# Patient Record
Sex: MALE | Race: WHITE | NOT HISPANIC OR LATINO | Employment: OTHER | URBAN - NONMETROPOLITAN AREA
[De-identification: names, ages, dates, MRNs, and addresses within clinical notes are randomized per-mention and may not be internally consistent; named-entity substitution may affect disease eponyms.]

---

## 2018-12-04 ENCOUNTER — OFFICE VISIT (OUTPATIENT)
Dept: GASTROENTEROLOGY | Facility: CLINIC | Age: 58
End: 2018-12-04

## 2018-12-04 VITALS
BODY MASS INDEX: 30.91 KG/M2 | DIASTOLIC BLOOD PRESSURE: 68 MMHG | WEIGHT: 228.2 LBS | HEIGHT: 72 IN | TEMPERATURE: 98.7 F | HEART RATE: 76 BPM | OXYGEN SATURATION: 98 % | SYSTOLIC BLOOD PRESSURE: 118 MMHG

## 2018-12-04 DIAGNOSIS — Z86.010 HX OF COLONIC POLYPS: Primary | ICD-10-CM

## 2018-12-04 PROCEDURE — S0285 CNSLT BEFORE SCREEN COLONOSC: HCPCS | Performed by: NURSE PRACTITIONER

## 2018-12-04 RX ORDER — LISINOPRIL 20 MG/1
20 TABLET ORAL DAILY
COMMUNITY

## 2018-12-04 RX ORDER — ATORVASTATIN CALCIUM 20 MG/1
20 TABLET, FILM COATED ORAL DAILY
COMMUNITY

## 2018-12-04 RX ORDER — GLIMEPIRIDE 2 MG/1
2 TABLET ORAL 2 TIMES DAILY
COMMUNITY

## 2018-12-04 NOTE — PROGRESS NOTES
Chief Complaint   Patient presents with   • Colon Cancer Screening     Subjective   HPI  Tenzin Sousa is a 58 y.o. male who presents as a referral for preventative maintenance. He has no complaints of nausea or vomiting. No change in bowels. No wt loss. No BRBPR. No melena. There is no family hx for colon cancer. No abdominal pain. There was no Cologuard screening this year. The patients last colonoscopy was 1/3/14 with finding of polyp.     Past Medical History:   Diagnosis Date   • Colon polyp    • DM (diabetes mellitus) (CMS/HCC)    • HTN (hypertension)    • Hyperlipemia      Past Surgical History:   Procedure Laterality Date   • COLONOSCOPY  01/03/2015       Current Outpatient Medications:   •  atorvastatin (LIPITOR) 20 MG tablet, Take 20 mg by mouth Daily., Disp: , Rfl:   •  glimepiride (AMARYL) 2 MG tablet, Take 2 mg by mouth Every Morning Before Breakfast., Disp: , Rfl:   •  lisinopril (PRINIVIL,ZESTRIL) 20 MG tablet, Take 20 mg by mouth Daily., Disp: , Rfl:   •  metFORMIN (GLUCOPHAGE) 500 MG tablet, Take 500 mg by mouth 2 (Two) Times a Day With Meals., Disp: , Rfl:   •  SITagliptin (JANUVIA) 100 MG tablet, Take 100 mg by mouth Daily., Disp: , Rfl:   •  Sod Picosulfate-Mag Ox-Cit Acd (CLENPIQ) 10-3.5-12 MG-GM -GM/160ML solution, Take 1 container by mouth Take As Directed., Disp: 1 bottle, Rfl: 0  No Known Allergies  Social History     Socioeconomic History   • Marital status:      Spouse name: Not on file   • Number of children: Not on file   • Years of education: Not on file   • Highest education level: Not on file   Social Needs   • Financial resource strain: Not on file   • Food insecurity - worry: Not on file   • Food insecurity - inability: Not on file   • Transportation needs - medical: Not on file   • Transportation needs - non-medical: Not on file   Occupational History   • Not on file   Tobacco Use   • Smoking status: Never Smoker   • Smokeless tobacco: Never Used   Substance and Sexual  "Activity   • Alcohol use: No     Frequency: Never   • Drug use: Not on file   • Sexual activity: Not on file   Other Topics Concern   • Not on file   Social History Narrative   • Not on file     Family History   Problem Relation Age of Onset   • Colon cancer Neg Hx    • Colon polyps Neg Hx        REVIEW OF SYSTEMS  General: well appearing, no fever chills or sweats, no unexplained wt loss  HEENT: no acute visual or hearing disturbances  Cardiovascular: No chest pain or palpitations  Pulmonary: No shortness of breath, coughing, wheezing or hemoptysis  : No burning, urgency, hematuria, or dysuria  Musculoskeletal: No joint pain or stiffness  Peripheral: no edema  Skin: No lesions or rashes  Neuro: No dizziness, headaches, stroke, syncope  Endocrine: No hot or cold intolerances  Hematological: No blood dyscrasias    Objective   Vitals:    12/04/18 0910   BP: 118/68   Pulse: 76   Temp: 98.7 °F (37.1 °C)   SpO2: 98%   Weight: 104 kg (228 lb 3.2 oz)   Height: 182.9 cm (72\")     Body mass index is 30.95 kg/m².    PHYSICAL EXAM  General: age appropriate well nourished well appearing, no acute distress  Head: normocephalic and atraumatic  Global assessment-supple  Neck-No JVD noted, no lymphadenopathy  Pulmonary-clear to auscultation bilaterally, normal respiratory effort  Cardiovascular-normal rate and rhythm, normal heart sounds, S1 and S2 noted  Abdomen-soft, non tender, non distended, normal bowel sounds all 4 quadrants, no hepatosplenomegaly noted  Extremities-No clubbing cyanosis or edema  Neuro-Non focal, converses appropriately, awake, alert, oriented    Imaging Results (most recent)     None        Assessment/Plan   Tenzin was seen today for colon cancer screening.    Diagnoses and all orders for this visit:    Hx of colonic polyps  -     Case Request; Standing  -     Case Request    Other orders  -     Follow Anesthesia Guidelines / Standing Orders; Future  -     Implement Anesthesia Orders Day of Procedure; " Standing  -     Obtain Informed Consent; Standing  -     Verify bowel prep was successful; Standing  -     Sod Picosulfate-Mag Ox-Cit Acd (CLENPIQ) 10-3.5-12 MG-GM -GM/160ML solution; Take 1 container by mouth Take As Directed.      COLONOSCOPY WITH ANESTHESIA (N/A)       Body mass index is 30.95 kg/m². Patient's Body mass index is 30.95 kg/m². BMI is above normal parameters. Recommendations include: no follow-up required.      All risks, benefits, alternatives, and indications of colonoscopy procedure have been discussed with the patient. Risks to include perforation of the colon requiring possible surgery or colostomy, risk of bleeding from biopsies or removal of colon tissue, possibility of missing a colon polyp or cancer, or adverse drug reaction.  Benefits to include the diagnosis and management of disease of the colon and rectum. Alternatives to include barium enema, radiographic evaluation, lab testing or no intervention. Pt verbalizes understanding and agrees.

## 2019-01-02 ENCOUNTER — HOSPITAL ENCOUNTER (OUTPATIENT)
Facility: HOSPITAL | Age: 59
Setting detail: HOSPITAL OUTPATIENT SURGERY
Discharge: HOME | End: 2019-01-02
Attending: INTERNAL MEDICINE | Admitting: INTERNAL MEDICINE

## 2019-01-02 ENCOUNTER — ANESTHESIA (OUTPATIENT)
Dept: GASTROENTEROLOGY | Facility: HOSPITAL | Age: 59
End: 2019-01-02

## 2019-01-02 ENCOUNTER — TELEPHONE (OUTPATIENT)
Dept: GASTROENTEROLOGY | Facility: CLINIC | Age: 59
End: 2019-01-02

## 2019-01-02 ENCOUNTER — ANESTHESIA EVENT (OUTPATIENT)
Dept: GASTROENTEROLOGY | Facility: HOSPITAL | Age: 59
End: 2019-01-02

## 2019-01-02 VITALS
DIASTOLIC BLOOD PRESSURE: 74 MMHG | TEMPERATURE: 98.1 F | WEIGHT: 225 LBS | BODY MASS INDEX: 30.48 KG/M2 | RESPIRATION RATE: 16 BRPM | HEART RATE: 83 BPM | OXYGEN SATURATION: 95 % | HEIGHT: 72 IN | SYSTOLIC BLOOD PRESSURE: 122 MMHG

## 2019-01-02 LAB — GLUCOSE BLDC GLUCOMTR-MCNC: 174 MG/DL (ref 70–130)

## 2019-01-02 PROCEDURE — 82962 GLUCOSE BLOOD TEST: CPT

## 2019-01-02 PROCEDURE — 25010000002 PROPOFOL 10 MG/ML EMULSION: Performed by: NURSE ANESTHETIST, CERTIFIED REGISTERED

## 2019-01-02 PROCEDURE — 45378 DIAGNOSTIC COLONOSCOPY: CPT | Performed by: INTERNAL MEDICINE

## 2019-01-02 RX ORDER — LIDOCAINE HYDROCHLORIDE 20 MG/ML
INJECTION, SOLUTION INFILTRATION; PERINEURAL AS NEEDED
Status: DISCONTINUED | OUTPATIENT
Start: 2019-01-02 | End: 2019-01-02 | Stop reason: SURG

## 2019-01-02 RX ORDER — SODIUM CHLORIDE 9 MG/ML
500 INJECTION, SOLUTION INTRAVENOUS CONTINUOUS PRN
Status: DISCONTINUED | OUTPATIENT
Start: 2019-01-02 | End: 2019-01-02 | Stop reason: HOSPADM

## 2019-01-02 RX ORDER — SODIUM CHLORIDE 0.9 % (FLUSH) 0.9 %
3 SYRINGE (ML) INJECTION AS NEEDED
Status: DISCONTINUED | OUTPATIENT
Start: 2019-01-02 | End: 2019-01-02 | Stop reason: HOSPADM

## 2019-01-02 RX ORDER — PROPOFOL 10 MG/ML
VIAL (ML) INTRAVENOUS AS NEEDED
Status: DISCONTINUED | OUTPATIENT
Start: 2019-01-02 | End: 2019-01-02 | Stop reason: SURG

## 2019-01-02 RX ADMIN — SODIUM CHLORIDE 500 ML: 9 INJECTION, SOLUTION INTRAVENOUS at 08:19

## 2019-01-02 RX ADMIN — PROPOFOL 70 MG: 10 INJECTION, EMULSION INTRAVENOUS at 09:16

## 2019-01-02 RX ADMIN — LIDOCAINE HYDROCHLORIDE 80 MG: 20 INJECTION, SOLUTION INFILTRATION; PERINEURAL at 09:16

## 2019-01-02 RX ADMIN — SODIUM CHLORIDE: 9 INJECTION, SOLUTION INTRAVENOUS at 09:14

## 2019-01-02 RX ADMIN — LIDOCAINE HYDROCHLORIDE 0.5 ML: 10 INJECTION, SOLUTION EPIDURAL; INFILTRATION; INTRACAUDAL; PERINEURAL at 08:19

## 2019-01-02 NOTE — ANESTHESIA PREPROCEDURE EVALUATION
Anesthesia Evaluation     Patient summary reviewed   no history of anesthetic complications:  NPO Solid Status: > 8 hours  NPO Liquid Status: > 2 hours           Airway   Mallampati: II  TM distance: >3 FB  Neck ROM: full  No difficulty expected  Dental - normal exam     Pulmonary - negative pulmonary ROS   Cardiovascular   Exercise tolerance: good (4-7 METS)    (+) hypertension, hyperlipidemia,       Neuro/Psych- negative ROS  GI/Hepatic/Renal/Endo    (+)   diabetes mellitus,   (-) liver disease, no renal disease    Musculoskeletal     Abdominal    Substance History      OB/GYN          Other                        Anesthesia Plan    ASA 2     general   total IV anesthesia  intravenous induction   Anesthetic plan, all risks, benefits, and alternatives have been provided, discussed and informed consent has been obtained with: patient.

## 2019-01-02 NOTE — ANESTHESIA POSTPROCEDURE EVALUATION
Patient: Tenzin Sousa    Procedure Summary     Date:  01/02/19 Room / Location:  [facility] ENDOSCOPY 5 / [facility]    Anesthesia Start:  0914 Anesthesia Stop:  0935    Procedure:  COLONOSCOPY WITH ANESTHESIA (N/A ) Diagnosis:       Hx of colonic polyps      (Hx of colonic polyps [Z86.010])    Surgeon:  Carmelina Meza MD Provider:  Kilo Walter CRNA    Anesthesia Type:  general ASA Status:  2          Anesthesia Type: general  Last vitals  BP   148/64 (01/02/19 0802)   Temp   98.1 °F (36.7 °C) (01/02/19 0802)   Pulse   82 (01/02/19 0802)   Resp   18 (01/02/19 0802)     SpO2   96 % (01/02/19 0802)     Post Anesthesia Care and Evaluation    Patient location during evaluation: PHASE II  Patient participation: complete - patient participated  Level of consciousness: awake  Pain score: 0  Pain management: adequate  Airway patency: patent  Anesthetic complications: No anesthetic complications  PONV Status: none  Cardiovascular status: acceptable  Respiratory status: acceptable  Hydration status: acceptable

## 2019-03-22 PROBLEM — K55.20 ANGIODYSPLASIA: Status: ACTIVE | Noted: 2019-03-22

## 2019-03-22 NOTE — PROGRESS NOTES
Primary Physician: Shana Merrill MD    Chief Complaint   Patient presents with   • Follow-up     Pt presents today for 3 month procedure f/u-had colonoscopy 2019; pt wants to discuss family history of liver problems        Subjective     Tenzin Sousa is a 59 y.o. male.    HPI   Abnormal liver function tests-new problem  He told me after his colonoscopy that there is family history of liver disease.  This appointment was set up to discuss this further.  He had one brother and sister passed on from cirrhosis.  There is yet another brother who is currently alive with liver disease.  The 2 siblings who  from cirrhosis were known diabetics and were overweight.  The brother who is currently living with liver disease drink alcohol heavily earlier in his life but is not doing this now.    I was able to find old laboratories in all scripts.  In 2014 the patient had blood work done to evaluate abnormal liver function tests which had been a problem for years and without change.  Review of records from [facility] shows that his liver tests have been mildly elevated from  through .  AST and ALT are in the 60-80 range.  He gave me a history back then that there is no history of alcohol intake, blood transfusions, tattoos, high risk sexual activity, or IV drug use.  He had been on no new medications.  He had blood work done that showed an abnormal alpha-1 antitrypsin level.  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  He had a negative hepatitis A, B and C panel, ferritin was 164.  Ceruloplasmin, ESTELA, AMA, smooth muscle antibody were all normal.  In  his AST was 54 and ALT was 77.  I felt at that time in  that he had nonalcoholic fatty liver disease based upon negative serologies and because he was diabetic and overweight.  He had an ultrasound done of his liver at [facility] 2014 that showed fatty liver.    He denies any right upper quadrant pain.  There is no  nausea or vomiting.  The patient is diabetic.  He is hypertensive as well.  He has struggled with weight issues.    Adenomatous colon polyp  He had adenomatous colon polyp removed in 2014.  Repeat colonoscopy January 2019 is unremarkable.  He will be due for repeat colonoscopy in January 2024.  He denies any melena or hematochezia.    Angiodysplasia  This is seen on colonoscopy January 2019.  He denies any melena or hematochezia.    Past Medical History:   Diagnosis Date   • Colon polyp    • DM (diabetes mellitus) (CMS/HCC)    • HTN (hypertension)    • Hyperlipemia        Past Surgical History:   Procedure Laterality Date   • COLONOSCOPY  01/03/2014    One less than 5mm tubular adenomatous polyp in the transverse colon; Repeat 5 years    • COLONOSCOPY N/A 1/2/2019    A single non-bleeding colonic angiodysplastic lesion; Examination was otherwise normal on direct and retroflexion views; No specimens collected; Repeat 5 years         Current Outpatient Medications:   •  atorvastatin (LIPITOR) 20 MG tablet, Take 20 mg by mouth Daily., Disp: , Rfl:   •  glimepiride (AMARYL) 2 MG tablet, Take 2 mg by mouth Every Morning Before Breakfast., Disp: , Rfl:   •  lisinopril (PRINIVIL,ZESTRIL) 20 MG tablet, Take 20 mg by mouth Daily., Disp: , Rfl:   •  metFORMIN (GLUCOPHAGE) 500 MG tablet, Take 500 mg by mouth 2 (Two) Times a Day With Meals., Disp: , Rfl:   •  SITagliptin (JANUVIA) 100 MG tablet, Take 100 mg by mouth Daily., Disp: , Rfl:     No Known Allergies    Social History     Socioeconomic History   • Marital status:      Spouse name: Not on file   • Number of children: Not on file   • Years of education: Not on file   • Highest education level: Not on file   Tobacco Use   • Smoking status: Never Smoker   • Smokeless tobacco: Never Used   Substance and Sexual Activity   • Alcohol use: No     Frequency: Never   • Drug use: No   • Sexual activity: Defer       Family History   Problem Relation Age of Onset   •  "Cirrhosis Sister    • Cirrhosis Brother    • Colon cancer Neg Hx    • Colon polyps Neg Hx        Review of Systems   Constitutional: Negative for fever and unexpected weight change.   HENT: Negative for hearing loss.    Eyes: Negative for visual disturbance.   Respiratory: Negative for cough.    Cardiovascular: Negative for chest pain.   Gastrointestinal:        See HPI   Endocrine: Negative for cold intolerance and heat intolerance.   Genitourinary: Negative for dysuria.   Musculoskeletal: Negative for arthralgias.   Skin: Negative for rash.   Neurological: Negative for seizures.   Psychiatric/Behavioral: Negative for hallucinations.       Objective     /82 (BP Location: Left arm, Patient Position: Sitting, Cuff Size: Adult)   Pulse 92   Temp 98.1 °F (36.7 °C) (Tympanic)   Ht 182.9 cm (72\")   Wt 103 kg (226 lb)   SpO2 98%   BMI 30.65 kg/m²     Physical Exam   Constitutional: He is oriented to person, place, and time. He appears well-developed.   HENT:   Head: Normocephalic.   Eyes: EOM are normal. No scleral icterus.   Neck: No thyromegaly present.   Cardiovascular: Normal rate and regular rhythm.   Pulmonary/Chest: Breath sounds normal.   Abdominal: Soft. Bowel sounds are normal. He exhibits no distension and no mass. There is no tenderness. There is no rebound and no guarding.   Musculoskeletal: Normal range of motion.   Neurological: He is alert and oriented to person, place, and time.   Skin: No rash noted.   Psychiatric: He has a normal mood and affect. His behavior is normal.   Vitals reviewed.      Lab Results   Component Value Date    WBC 8.24 08/16/2015    HGB 14.9 08/16/2015    HCT 43.0 08/16/2015     08/16/2015        Lab Results   Component Value Date     08/16/2015    K 4.2 08/16/2015    CL 99 08/16/2015    CO2 26 08/16/2015    BUN 13 08/16/2015    CREATININE 0.80 08/16/2015    BILITOT 0.7 08/16/2015    ALKPHOS 47 08/16/2015    ALT 79 (H) 08/16/2015    AST 61 (H) 08/16/2015    " GLUCOSE 172 (H) 08/16/2015       Lab Results   Component Value Date    INR 0.97 08/16/2015     TEST PERFORMED AT [facility]          EXAM#     TYPE/EXAM                       RESULT                                 [id number] LMPUS/U/S RT UPPER QUAD                                                       Examination: Ultrasound, right upper quadrant.               History: The patient has elevated liver enzymes and abnormal liver       functions.               Ultrasound examination of the right upper abdomen was performed. There       is no previous study for comparison.               Diffuse increased echogenicity of the liver parenchyma is seen       suggesting fatty infiltration. No discrete mass.               A normal gallbladder is seen. There is no evidence of gallstones. The       gallbladder wall measures 2 mm in thickness. A normal common bile duct       is seen measuring 3 mm in diameter.               Fatty infiltration of the pancreas is noted.               A normal right kidney is seen measuring 12.5 x 6.1 x 6.6 cm. No       intrinsic mass or hydronephrosis.               A normal abdominal aorta and inferior vena cava are seen.                       IMPRESSION:        1. Fatty infiltration of the liver. No discrete focal abnormality of       the liver.       2. Normal gallbladder. No evidence of gallstones.                                      REPORT SIGNED IN OTHER VENDOR SYSTEM 07/15/2014                       Reported By: CRUZ BULL DR    IMPRESSION/PLAN:    Assessment/Plan      Problem List Items Addressed This Visit        Cardiovascular and Mediastinum    Angiodysplasia    Overview     Seen in the colon in January 2019.            Other    Hx of colonic polyps    Overview     Adenomatous colon polyp removed from the transverse colon in January 2014.         Abnormal liver function tests - Primary    Overview     A full serological evaluation was done in 2014.  The only  significant finding is that he has a low alpha-1 antitrypsin level with genotype MZ.  This is not the cause of his abn LFTs.  Sono imaging 5 years ago shows fatty liver.  The patient does have metabolic syndrome.  Working diagnosis is that of nonalcoholic fatty liver disease.  This is reviewed with him.    We will check hemoglobin A1c, LFTs, CBC, INR, AFP.  This will allow me to calculate fib-4 and APRI score.  We will also check sonogram of the liver with elastography.    The principles of management of steatohepatitis are weight loss through a structured diet and exercise as well as meticulous control of any component of metabolic syndrome, including blood glucose levels, cholesterol and blood pressure.   The patient should strive to lose 2-4 monthly until reaching 7-10% reduction in weight.      Coffee consumption has been shown to decrease the risk of HCC in patients with chronic liver disease.  In these patients, coffee consumption should be encouraged.    I have advised to avoid fructose containing food and drink.     Daily alcohol intake should strickly be below 30gm in men and 20 gm in women.    A physical activity program should in 150-200 min/week of moderate intensity in 3-5 sessions. Resistance training to promote musculoskeletal fitness and improve metabolic factors was reviewed.         Relevant Orders    Comprehensive Metabolic Panel    Protime-INR    US Liver    US Elastography Parenchyma    AFP Tumor Marker    CBC & Differential    Vitamin D 25 Hydroxy    Hemoglobin A1c        Patient's Body mass index is 30.65 kg/m². BMI is above normal parameters. Recommendations include: nutrition counseling.        RTC 6 months      Carmelina Meza MD  03/25/19  4:03 PM    Much of this encounter note is an electronic transcription/translation of spoken language to printed text. The electronic translation of spoken language may permit erroneous, or at times, nonsensical words or phrases to be inadvertently  transcribed; although I have reviewed the note for such errors, some may still exist.

## 2019-03-25 ENCOUNTER — OFFICE VISIT (OUTPATIENT)
Dept: GASTROENTEROLOGY | Facility: CLINIC | Age: 59
End: 2019-03-25

## 2019-03-25 ENCOUNTER — LAB (OUTPATIENT)
Dept: LAB | Facility: HOSPITAL | Age: 59
End: 2019-03-25

## 2019-03-25 VITALS
HEIGHT: 72 IN | OXYGEN SATURATION: 98 % | HEART RATE: 92 BPM | TEMPERATURE: 98.1 F | SYSTOLIC BLOOD PRESSURE: 130 MMHG | WEIGHT: 226 LBS | BODY MASS INDEX: 30.61 KG/M2 | DIASTOLIC BLOOD PRESSURE: 82 MMHG

## 2019-03-25 DIAGNOSIS — K55.20 ANGIODYSPLASIA: ICD-10-CM

## 2019-03-25 DIAGNOSIS — Z86.010 HX OF COLONIC POLYPS: ICD-10-CM

## 2019-03-25 DIAGNOSIS — R79.89 ABNORMAL LIVER FUNCTION TESTS: Primary | ICD-10-CM

## 2019-03-25 DIAGNOSIS — R79.89 ABNORMAL LIVER FUNCTION TESTS: ICD-10-CM

## 2019-03-25 LAB
25(OH)D3 SERPL-MCNC: 31.3 NG/ML (ref 30–100)
ALBUMIN SERPL-MCNC: 5.1 G/DL (ref 3.5–5)
ALBUMIN/GLOB SERPL: 2 G/DL (ref 1.1–2.5)
ALP SERPL-CCNC: 56 U/L (ref 24–120)
ALT SERPL W P-5'-P-CCNC: 83 U/L (ref 0–54)
ANION GAP SERPL CALCULATED.3IONS-SCNC: 12 MMOL/L (ref 4–13)
AST SERPL-CCNC: 66 U/L (ref 7–45)
BASOPHILS # BLD AUTO: 0.07 10*3/MM3 (ref 0–0.2)
BASOPHILS NFR BLD AUTO: 1 % (ref 0–2)
BILIRUB SERPL-MCNC: 0.7 MG/DL (ref 0.1–1)
BUN BLD-MCNC: 11 MG/DL (ref 5–21)
BUN/CREAT SERPL: 16.9 (ref 7–25)
CALCIUM SPEC-SCNC: 9.7 MG/DL (ref 8.4–10.4)
CHLORIDE SERPL-SCNC: 100 MMOL/L (ref 98–110)
CO2 SERPL-SCNC: 26 MMOL/L (ref 24–31)
CREAT BLD-MCNC: 0.65 MG/DL (ref 0.5–1.4)
DEPRECATED RDW RBC AUTO: 37.3 FL (ref 40–54)
EOSINOPHIL # BLD AUTO: 0.2 10*3/MM3 (ref 0–0.7)
EOSINOPHIL NFR BLD AUTO: 2.8 % (ref 0–4)
ERYTHROCYTE [DISTWIDTH] IN BLOOD BY AUTOMATED COUNT: 12.5 % (ref 12–15)
GFR SERPL CREATININE-BSD FRML MDRD: 127 ML/MIN/1.73
GLOBULIN UR ELPH-MCNC: 2.5 GM/DL
GLUCOSE BLD-MCNC: 145 MG/DL (ref 70–100)
HBA1C MFR BLD: 8.4 %
HCT VFR BLD AUTO: 40.8 % (ref 40–52)
HGB BLD-MCNC: 15.1 G/DL (ref 14–18)
IMM GRANULOCYTES # BLD AUTO: 0.04 10*3/MM3 (ref 0–0.05)
IMM GRANULOCYTES NFR BLD AUTO: 0.6 % (ref 0–5)
INR PPP: 0.95 (ref 0.91–1.09)
LYMPHOCYTES # BLD AUTO: 2.05 10*3/MM3 (ref 0.72–4.86)
LYMPHOCYTES NFR BLD AUTO: 28.3 % (ref 15–45)
MCH RBC QN AUTO: 30.3 PG (ref 28–32)
MCHC RBC AUTO-ENTMCNC: 37 G/DL (ref 33–36)
MCV RBC AUTO: 81.9 FL (ref 82–95)
MONOCYTES # BLD AUTO: 0.58 10*3/MM3 (ref 0.19–1.3)
MONOCYTES NFR BLD AUTO: 8 % (ref 4–12)
NEUTROPHILS # BLD AUTO: 4.3 10*3/MM3 (ref 1.87–8.4)
NEUTROPHILS NFR BLD AUTO: 59.3 % (ref 39–78)
NRBC BLD AUTO-RTO: 0 /100 WBC (ref 0–0)
PLATELET # BLD AUTO: 187 10*3/MM3 (ref 130–400)
PMV BLD AUTO: 10.3 FL (ref 6–12)
POTASSIUM BLD-SCNC: 4.1 MMOL/L (ref 3.5–5.3)
PROT SERPL-MCNC: 7.6 G/DL (ref 6.3–8.7)
PROTHROMBIN TIME: 13 SECONDS (ref 11.9–14.6)
RBC # BLD AUTO: 4.98 10*6/MM3 (ref 4.8–5.9)
SODIUM BLD-SCNC: 138 MMOL/L (ref 135–145)
WBC NRBC COR # BLD: 7.24 10*3/MM3 (ref 4.8–10.8)

## 2019-03-25 PROCEDURE — 83036 HEMOGLOBIN GLYCOSYLATED A1C: CPT | Performed by: INTERNAL MEDICINE

## 2019-03-25 PROCEDURE — 80053 COMPREHEN METABOLIC PANEL: CPT | Performed by: INTERNAL MEDICINE

## 2019-03-25 PROCEDURE — 36415 COLL VENOUS BLD VENIPUNCTURE: CPT

## 2019-03-25 PROCEDURE — 82306 VITAMIN D 25 HYDROXY: CPT | Performed by: INTERNAL MEDICINE

## 2019-03-25 PROCEDURE — 85025 COMPLETE CBC W/AUTO DIFF WBC: CPT | Performed by: INTERNAL MEDICINE

## 2019-03-25 PROCEDURE — 99214 OFFICE O/P EST MOD 30 MIN: CPT | Performed by: INTERNAL MEDICINE

## 2019-03-25 PROCEDURE — 85610 PROTHROMBIN TIME: CPT | Performed by: INTERNAL MEDICINE

## 2019-03-25 PROCEDURE — 82105 ALPHA-FETOPROTEIN SERUM: CPT | Performed by: INTERNAL MEDICINE

## 2019-03-26 LAB — AFP-TM SERPL-MCNC: 6.2 NG/ML (ref 0–8.3)

## 2019-03-27 ENCOUNTER — HOSPITAL ENCOUNTER (OUTPATIENT)
Dept: ULTRASOUND IMAGING | Facility: HOSPITAL | Age: 59
Discharge: HOME | End: 2019-03-27
Admitting: INTERNAL MEDICINE

## 2019-03-27 ENCOUNTER — HOSPITAL ENCOUNTER (OUTPATIENT)
Dept: ULTRASOUND IMAGING | Facility: HOSPITAL | Age: 59
Discharge: HOME | End: 2019-03-27

## 2019-03-27 DIAGNOSIS — R79.89 ABNORMAL LIVER FUNCTION TESTS: ICD-10-CM

## 2019-03-27 PROCEDURE — 76981 USE PARENCHYMA: CPT

## 2019-03-27 PROCEDURE — 76705 ECHO EXAM OF ABDOMEN: CPT

## 2019-04-01 ENCOUNTER — TELEPHONE (OUTPATIENT)
Dept: GASTROENTEROLOGY | Facility: CLINIC | Age: 59
End: 2019-04-01

## 2019-04-01 NOTE — TELEPHONE ENCOUNTER
Please let patient know that his laboratories and ultrasound imaging is certainly consistent with fatty liver.  His sugars are poorly controlled with elevated hemoglobin A1c.  It does not appear that he has cirrhosis at this point however he does need to find his lifestyle to prevent further progression of liver disease.     The principles of management of steatohepatitis are weight loss through a structured diet and exercise as well as meticulous control of any component of metabolic syndrome, including blood glucose levels, cholesterol and blood pressure.   The patient should strive to lose 2-4 monthly until reaching 7-10% reduction in weight.      Coffee consumption has been shown to decrease the risk of liver cancer in patients with chronic liver disease.  In these patients, coffee consumption should be encouraged.    I have advised to avoid fructose containing food and drink.     Daily alcohol intake should strickly be below 30gm in men and 20 gm in women.    A physical activity program should in 150-200 min/week of moderate intensity in 3-5 sessions. Resistance training to promote musculoskeletal fitness and improve metabolic factors was reviewed.    Carmelina Meza MD

## 2019-04-02 NOTE — TELEPHONE ENCOUNTER
Spoke to pt re: results-he VU. I am going to mail him a letter with this info as well as a copy of a fatty liver diet for his reference. He will call me back with any further questions.

## 2019-09-22 NOTE — PROGRESS NOTES
Primary Physician: Shana Merrill MD    Chief Complaint   Patient presents with   • Follow-up     Pt presents today for fatty liver follow up-pt states he is feeling good       Subjective     Tenzin Sousa is a 59 y.o. male.    HPI   Abnormal liver function tests  He told me that there is family history of liver disease.  He had one brother and sister passed on from cirrhosis.  There is yet another brother who is currently alive with liver disease.  The 2 siblings who  from cirrhosis were known diabetics and were overweight.  The brother who is currently living with liver disease drink alcohol heavily earlier in his life but is not doing this now.     I was able to find old laboratories in all scripts.  In 2014 the patient had blood work done to evaluate abnormal liver function tests which had been a problem for years and without change.  Review of records from [facility] shows that his liver tests have been mildly elevated from  through .  AST and ALT are in the 60-80 range.  He gave me a history back then that there is no history of alcohol intake, blood transfusions, tattoos, high risk sexual activity, or IV drug use.  He had been on no new medications.  He had blood work done that showed an abnormal alpha-1 antitrypsin level.  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  He had a negative hepatitis A, B and C panel, ferritin was 164.  Ceruloplasmin, ESTELA, AMA, smooth muscle antibody were all normal.  In  his AST was 54 and ALT was 77.  I felt at that time in  that he had nonalcoholic fatty liver disease based upon negative serologies and because he was diabetic and overweight.  He had an ultrasound done of his liver at [facility] 2014 that showed fatty liver.  Sonogram 3/2019 shows fatty liver and Elastography showed F2 status.    Labs in 2019 continue to show mildly elevated liver function tests.  Vitamin D level is on the low side of normal.     He  denies any right upper quadrant pain.  There is no nausea or vomiting.  The patient is diabetic.  He is hypertensive as well.  He has struggled with weight issues.  He has been able to lose 5 pounds since last seen in March 2019.  He had blood work done at [facility] on August 14, 2019 which I personally reviewed.  AST was 34 and ALT was 41.  Both of these are in the normal range.     Adenomatous colon polyp  He had adenomatous colon polyp removed in 2014.  Repeat colonoscopy January 2019 is unremarkable.  He will be due for repeat colonoscopy in January 2024.  He denies any melena or hematochezia.     Angiodysplasia  This is seen on colonoscopy January 2019.  He denies any melena or hematochezia.    Past Medical History:   Diagnosis Date   • DM (diabetes mellitus) (CMS/HCC)    • Fatty liver    • History of colon polyps    • HTN (hypertension)    • Hyperlipemia        Past Surgical History:   Procedure Laterality Date   • COLONOSCOPY  01/03/2014    One less than 5mm tubular adenomatous polyp in the transverse colon; Repeat 5 years    • COLONOSCOPY N/A 1/2/2019    A single non-bleeding colonic angiodysplastic lesion; Examination was otherwise normal on direct and retroflexion views; No specimens collected; Repeat 5 years         Current Outpatient Medications:   •  atorvastatin (LIPITOR) 20 MG tablet, Take 20 mg by mouth Daily., Disp: , Rfl:   •  glimepiride (AMARYL) 2 MG tablet, Take 2 mg by mouth Every Morning Before Breakfast., Disp: , Rfl:   •  lisinopril (PRINIVIL,ZESTRIL) 20 MG tablet, Take 20 mg by mouth Daily., Disp: , Rfl:   •  metFORMIN (GLUCOPHAGE) 500 MG tablet, Take 500 mg by mouth 2 (Two) Times a Day With Meals., Disp: , Rfl:   •  SITagliptin (JANUVIA) 100 MG tablet, Take 100 mg by mouth Daily., Disp: , Rfl:     No Known Allergies    Social History     Socioeconomic History   • Marital status:      Spouse name: Not on file   • Number of children: Not on file   • Years of education: Not on  "file   • Highest education level: Not on file   Tobacco Use   • Smoking status: Never Smoker   • Smokeless tobacco: Never Used   Substance and Sexual Activity   • Alcohol use: No     Frequency: Never   • Drug use: No   • Sexual activity: Defer       Family History   Problem Relation Age of Onset   • Cirrhosis Sister    • Cirrhosis Brother    • Liver disease Brother    • Colon cancer Neg Hx    • Colon polyps Neg Hx    • Esophageal cancer Neg Hx    • Liver cancer Neg Hx    • Rectal cancer Neg Hx    • Stomach cancer Neg Hx        Review of Systems   Constitutional: Negative for fever.   Respiratory: Negative for cough and shortness of breath.    Cardiovascular: Negative for chest pain.   Genitourinary: Negative for dysuria.   Skin: Negative for rash.   Neurological: Negative for seizures.       Objective     /70 (BP Location: Left arm, Patient Position: Sitting, Cuff Size: Adult)   Pulse 78   Temp 99 °F (37.2 °C) (Tympanic)   Ht 182.9 cm (72\")   Wt 100 kg (221 lb)   SpO2 99%   BMI 29.97 kg/m²     Physical Exam   Constitutional: He is oriented to person, place, and time. He appears well-developed and well-nourished.   Eyes: EOM are normal.   Pulmonary/Chest: Effort normal.   Musculoskeletal: Normal range of motion.   Neurological: He is alert and oriented to person, place, and time.   Skin: Skin is warm.   Psychiatric: He has a normal mood and affect. His behavior is normal.       Lab Results - Last 18 Months   Lab Units 08/14/19  0944 03/25/19  1619   GLUCOSE mg/dL 198* 145*   BUN mg/dL 13 11   CREATININE mg/dL 0.7 0.65   SODIUM mmol/L 140 138   POTASSIUM mmol/L 4.5 4.1   CHLORIDE mmol/L 99 100   TOTAL CO2 mmol/L 23  --    CO2 mmol/L  --  26.0   TOTAL PROTEIN g/dL 7.8 7.6   ALBUMIN g/dL 5 5.10*   ALT (SGPT) U/L 41 83*   AST (SGOT) U/L 34 66*   ALK PHOS U/L 48 56   BILIRUBIN mg/dL 1.1 0.7   GLOBULIN gm/dL  --  2.5       Lab Results - Last 18 Months   Lab Units 08/14/19  0944 03/25/19  1619 01/02/19  1038 " 03/30/18  0802   GLUCOSE mg/dL 198* 145* 192* 173*   BUN mg/dL 13 11 9 10   CREATININE mg/dL 0.7 0.65 0.8 0.7   SODIUM mmol/L 140 138 145 139   POTASSIUM mmol/L 4.5 4.1 4.3 4.7   CHLORIDE mmol/L 99 100 102 100   TOTAL CO2 mmol/L 23  --  21* 25   CO2 mmol/L  --  26.0  --   --    TOTAL PROTEIN g/dL 7.8 7.6 7.4 6.8   ALBUMIN g/dL 5 5.10* 4.7 4.4   ALT (SGPT) U/L 41 83* 71* 48*   AST (SGOT) U/L 34 66* 61* 40   ALK PHOS U/L 48 56 44 37*   BILIRUBIN mg/dL 1.1 0.7 1.0 0.9   GLOBULIN gm/dL  --  2.5  --   --        Lab Results - Last 18 Months   Lab Units 08/14/19  0944 03/25/19  1619   HEMOGLOBIN g/dL 14.7 15.1   HEMATOCRIT % 42.5 40.8   MCV fL 88.4 81.9*   WBC K/uL 7.2 7.24   RDW % 12.8 12.5   MPV fL 10.5 10.3   PLATELETS K/uL 166 187   INR   --  0.95       Lab Results - Last 18 Months   Lab Units 08/14/19  0944 03/25/19  1619   TSH uIU/mL 2.170  --    VIT D 25 HYDROXY ng/ml  --  31.3        Lab Results - Last 18 Months   Lab Units 03/25/19  1619   AFP TUMOR MARKER ng/mL 6.2        US ELASTOGRAPHY PARENCHYMA- 3/27/2019 8:04 AM CDT     HISTORY: abnormal liver function tests; R94.5-Abnormal results of liver  function studies      COMPARISON: None     TECHNIQUE: Transverse and longitudinal sonographic images of the liver  and right upper quadrant were obtained using a [employer] E9 sonography  system and a C1-6 curved array probe. Elastography was also performed.  Images are obtained in the usual manner with right arm above shoulder  rolled 30 degrees. A median of 10 measurements was calculated. An  interquartile range IQR/median <0.3 considered reliable data.     FINDINGS:  There is diffusely increased echogenicity throughout the liver without  focal lesion. Contour is normal. The gallbladder has been removed. The  common bile duct measures 0.4 cm.     10 shear wave measurements were acquired and demonstrate a median  velocity of 1.76 m/s. The IQR/median velocity ratio is 0.05.     Metavir Score F2 -- Portal fibrosis with few  septa.     IMPRESSION:  1. Fatty liver disease.  2. Metavir score F2 -- Portal fibrosis with few septa.     Shear wave measurements may be affected by hepatic congestion,  steatosis, and inflammation. Shear wave speed measurements should be  interpreted in conjunction with other available clinical data, and they  should not be considered interchangeable with MRI-derived stiffness  measurements at this time.     This report was finalized on 03/27/2019 08:59 by Dr. Donald Boyd MD.      IMPRESSION/PLAN:    Assessment/Plan      Problem List Items Addressed This Visit        Cardiovascular and Mediastinum    Angiodysplasia    Overview     Seen in the colon in January 2019.            Other    Hx of colonic polyps    Overview     Adenomatous colon polyp removed from the transverse colon in January 2014.  Last colonoscopy done January 2019.  Due for repeat colonoscopy in January 2024.         Abnormal liver function tests - Primary    Overview     A full serological evaluation was done in 2014.  The only significant finding is that he has a low alpha-1 antitrypsin level with genotype MZ.  This is not the cause of his abn LFTs.  Sono imaging 5 years ago shows fatty liver.  The patient does have metabolic syndrome.  Working diagnosis is that of nonalcoholic fatty liver disease.  This is reviewed with him.    Laboratories 3/2019:  hemoglobin A1c is elevated to 8.4.  AFP normal  vitamin D level low range of normal-I have advised OTC supplement  APRI and Fib-4 score nondiagnostic for adv fibrosis  F2 on elastography  Fatty liver seen on ultrasound.      The principles of management of steatohepatitis are weight loss through a structured diet and exercise as well as meticulous control of any component of metabolic syndrome, including blood glucose levels, cholesterol and blood pressure.   The patient should strive to lose 2-4 monthly until reaching 7-10% reduction in weight.      Coffee consumption has been shown to decrease  the risk of HCC in patients with chronic liver disease.  In these patients, coffee consumption should be encouraged.    I have advised to avoid fructose containing food and drink.     Daily alcohol intake should strickly be below 30gm in men and 20 gm in women.    A physical activity program should in 150-200 min/week of moderate intensity in 3-5 sessions. Resistance training to promote musculoskeletal fitness and improve metabolic factors was reviewed.             Patient's Body mass index is 29.97 kg/m². BMI is above normal parameters. Recommendations include: nutrition counseling.        RTC 1 year      Carmelina Meza MD  09/23/19  2:41 PM    Much of this encounter note is an electronic transcription/translation of spoken language to printed text. The electronic translation of spoken language may permit erroneous, or at times, nonsensical words or phrases to be inadvertently transcribed; although I have reviewed the note for such errors, some may still exist.

## 2019-09-23 ENCOUNTER — OFFICE VISIT (OUTPATIENT)
Dept: GASTROENTEROLOGY | Facility: CLINIC | Age: 59
End: 2019-09-23

## 2019-09-23 VITALS
OXYGEN SATURATION: 99 % | TEMPERATURE: 99 F | WEIGHT: 221 LBS | BODY MASS INDEX: 29.93 KG/M2 | SYSTOLIC BLOOD PRESSURE: 118 MMHG | DIASTOLIC BLOOD PRESSURE: 70 MMHG | HEIGHT: 72 IN | HEART RATE: 78 BPM

## 2019-09-23 DIAGNOSIS — R79.89 ABNORMAL LIVER FUNCTION TESTS: Primary | ICD-10-CM

## 2019-09-23 DIAGNOSIS — Z86.010 HX OF COLONIC POLYPS: ICD-10-CM

## 2019-09-23 DIAGNOSIS — K55.20 ANGIODYSPLASIA: ICD-10-CM

## 2019-09-23 PROCEDURE — 99213 OFFICE O/P EST LOW 20 MIN: CPT | Performed by: INTERNAL MEDICINE

## 2020-09-17 NOTE — PROGRESS NOTES
Primary Physician: Shana Merrill MD    Chief Complaint   Patient presents with   • Follow-up     Pt presents today for fatty liver follow up; Pt states he is feeling good        Subjective     Tenzin Sousa is a 60 y.o. male.    HPI   Abnormal liver function tests  He had one brother and sister passed on from cirrhosis.  There is yet another brother who is currently alive with liver disease.  The 2 siblings who  from cirrhosis were known diabetics and were overweight.  The brother who is currently living with liver disease drink alcohol heavily earlier in his life but is not doing this now.     I was able to find old laboratories in all scripts.  In 2014 the patient had blood work done to evaluate abnormal liver function tests which had been a problem for years and without change.  Review of records from [facility] shows that his liver tests have been mildly elevated from  through .  AST and ALT are in the 60-80 range.  He gave me a history back then that there is no history of alcohol intake, blood transfusions, tattoos, high risk sexual activity, or IV drug use.  He had been on no new medications.  He had blood work done that showed an abnormal alpha-1 antitrypsin level.  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  He had a negative hepatitis A, B and C panel, ferritin was 164.  Ceruloplasmin, ESTELA, AMA, smooth muscle antibody were all normal.  In  his AST was 54 and ALT was 77.  I felt at that time in  that he had nonalcoholic fatty liver disease based upon negative serologies and because he was diabetic and overweight.  He had an ultrasound done of his liver at [facility] 2014 that showed fatty liver.  Sonogram 3/2019 shows fatty liver and Elastography showed F2 status.     Labs in 2019 continue to show mildly elevated liver function tests.  Vitamin D level is on the low side of normal, but he hasn't started any supplements to date.     The patient  is diabetic.  He is hypertensive as well.  He has struggled with weight issues.  He had blood work done at [facility] on August 14, 2019 which I personally reviewed.  AST was 34 and ALT was 41.  Both of these are in the normal range.     Adenomatous colon polyp  He had adenomatous colon polyp removed in 2014.  Repeat colonoscopy January 2019 is unremarkable.  He will be due for repeat colonoscopy in January 2024.  He denies any melena or hematochezia.      Angiodysplasia  This was seen on colonoscopy January 2019.  He denies any melena or hematochezia.       Past Medical History:   Diagnosis Date   • DM (diabetes mellitus) (CMS/HCC)    • Fatty liver    • History of adenomatous polyp of colon    • HTN (hypertension)    • Hyperlipemia        Past Surgical History:   Procedure Laterality Date   • COLONOSCOPY  01/03/2014    One less than 5mm tubular adenomatous polyp in the transverse colon; Repeat 5 years    • COLONOSCOPY N/A 1/2/2019    A single non-bleeding colonic angiodysplastic lesion; Examination was otherwise normal on direct and retroflexion views; No specimens collected; Repeat 5 years         Current Outpatient Medications:   •  atorvastatin (LIPITOR) 20 MG tablet, Take 20 mg by mouth Daily., Disp: , Rfl:   •  glimepiride (AMARYL) 2 MG tablet, Take 2 mg by mouth Every Morning Before Breakfast., Disp: , Rfl:   •  lisinopril (PRINIVIL,ZESTRIL) 20 MG tablet, Take 20 mg by mouth Daily., Disp: , Rfl:   •  metFORMIN (GLUCOPHAGE) 500 MG tablet, Take 500 mg by mouth 2 (Two) Times a Day With Meals., Disp: , Rfl:   •  SITagliptin (JANUVIA) 100 MG tablet, Take 100 mg by mouth Daily., Disp: , Rfl:     No Known Allergies    Social History     Socioeconomic History   • Marital status:      Spouse name: Not on file   • Number of children: Not on file   • Years of education: Not on file   • Highest education level: Not on file   Tobacco Use   • Smoking status: Never Smoker   • Smokeless tobacco: Never Used  "  Substance and Sexual Activity   • Alcohol use: No     Frequency: Never   • Drug use: No   • Sexual activity: Defer       Family History   Problem Relation Age of Onset   • Cirrhosis Sister    • Cirrhosis Brother    • Liver disease Brother    • Colon cancer Neg Hx    • Colon polyps Neg Hx    • Esophageal cancer Neg Hx    • Liver cancer Neg Hx    • Rectal cancer Neg Hx    • Stomach cancer Neg Hx        Review of Systems   Constitutional: Negative for fever and unexpected weight change.   HENT: Negative for hearing loss.    Eyes: Negative for visual disturbance.   Respiratory: Negative for cough.    Cardiovascular: Negative for chest pain.   Gastrointestinal:        See HPI   Endocrine: Negative for cold intolerance and heat intolerance.   Genitourinary: Negative for dysuria.   Musculoskeletal: Negative for arthralgias.   Skin: Negative for rash.   Neurological: Negative for seizures.   Psychiatric/Behavioral: Negative for hallucinations.       Objective     /80 (BP Location: Left arm, Patient Position: Sitting, Cuff Size: Adult)   Pulse 76   Temp 97.8 °F (36.6 °C) (Infrared)   Ht 182.9 cm (72\")   Wt 98 kg (216 lb)   SpO2 98%   BMI 29.29 kg/m²     Physical Exam  Constitutional:       Appearance: He is well-developed.   Pulmonary:      Effort: Pulmonary effort is normal.   Musculoskeletal: Normal range of motion.   Skin:     General: Skin is warm.   Neurological:      Mental Status: He is alert and oriented to person, place, and time.   Psychiatric:         Behavior: Behavior normal.         Lab Results - Last 18 Months   Lab Units 06/05/20  0702 02/24/20  0827 11/18/19  0715 08/14/19  0944   GLUCOSE mg/dL 130* 314* 215* 198*   BUN mg/dL 11 10 9 13   CREATININE mg/dL 0.7 0.7 0.7 0.7   SODIUM mmol/L 141 134* 139 140   POTASSIUM mmol/L 4.4 5.4* 4.9 4.5   CHLORIDE mmol/L 102 95* 101 99   TOTAL CO2 mmol/L 23 27 25 23   TOTAL PROTEIN g/dL 6.7 6.6 7.3 7.8   ALBUMIN g/dL 4.6 4.6 4.8 5   ALT (SGPT) U/L 45* 96* 66* " 41   AST (SGOT) U/L 39 66* 46* 34   ALK PHOS U/L 48 66 52 48   BILIRUBIN mg/dL 0.5 0.9 0.9 1.1       Lab Results - Last 18 Months   Lab Units 06/05/20  0702 11/18/19  0715 08/14/19  0944   HEMOGLOBIN g/dL 14.2 15.2 14.7   HEMATOCRIT % 41.6* 43.9 42.5   MCV fL 88.9 89.2 88.4   WBC K/uL 5.9 6.0 7.2   RDW % 12.8 12.8 12.8   MPV fL 10.7 10.4 10.5   PLATELETS K/uL 154 166 166       Lab Results - Last 18 Months   Lab Units 06/05/20  0702 11/18/19  0715 08/14/19  0944   TSH uIU/mL 1.950 2.240 2.170        US ELASTOGRAPHY PARENCHYMA- 3/27/2019 8:04 AM CDT     HISTORY: abnormal liver function tests; R94.5-Abnormal results of liver  function studies      COMPARISON: None     TECHNIQUE: Transverse and longitudinal sonographic images of the liver  and right upper quadrant were obtained using a [employer] E9 sonography  system and a C1-6 curved array probe. Elastography was also performed.  Images are obtained in the usual manner with right arm above shoulder  rolled 30 degrees. A median of 10 measurements was calculated. An  interquartile range IQR/median <0.3 considered reliable data.     FINDINGS:  There is diffusely increased echogenicity throughout the liver without  focal lesion. Contour is normal. The gallbladder has been removed. The  common bile duct measures 0.4 cm.     10 shear wave measurements were acquired and demonstrate a median  velocity of 1.76 m/s. The IQR/median velocity ratio is 0.05.     Metavir Score F2 -- Portal fibrosis with few septa.     IMPRESSION:  1. Fatty liver disease.  2. Metavir score F2 -- Portal fibrosis with few septa.     Shear wave measurements may be affected by hepatic congestion,  steatosis, and inflammation. Shear wave speed measurements should be  interpreted in conjunction with other available clinical data, and they  should not be considered interchangeable with MRI-derived stiffness  measurements at this time.     This report was finalized on 03/27/2019 08:59 by Dr. Donald Boyd,  MD.    IMPRESSION/PLAN:    Assessment/Plan      Problem List Items Addressed This Visit        Cardiovascular and Mediastinum    Angiodysplasia    Overview     Seen in the colon in January 2019.            Other    Hx of colonic polyps    Overview     Adenomatous colon polyp removed from the transverse colon in January 2014.  Last colonoscopy done January 2019.  Due for repeat colonoscopy in January 2024.         Abnormal liver function tests - Primary    Overview     A full serological evaluation was done in 2014.  The only significant finding is that he has a low alpha-1 antitrypsin level with genotype MZ.  This is not the cause of his abn LFTs.  Sono imaging 5 years ago shows fatty liver.  The patient does have metabolic syndrome.  Working diagnosis is that of nonalcoholic fatty liver disease.  This is reviewed with him.    Laboratories 3/2019:  hemoglobin A1c is elevated to 8.4.  AFP normal  vitamin D level low range of normal-I have advised OTC supplement.  He hasn't started any yet.  APRI and Fib-4 score nondiagnostic for adv fibrosis  F2 on elastography  Fatty liver seen on ultrasound.      The principles of management of steatohepatitis are weight loss through a structured diet and exercise as well as meticulous control of any component of metabolic syndrome, including blood glucose levels, cholesterol and blood pressure.   The patient should strive to lose 2-4 monthly until reaching 7-10% reduction in weight.      Coffee consumption has been shown to decrease the risk of HCC in patients with chronic liver disease.  In these patients, coffee consumption should be encouraged.    I have advised to avoid fructose containing food and drink.     Daily alcohol intake should strickly be below 30gm in men and 20 gm in women.    A physical activity program should in 150-200 min/week of moderate intensity in 3-5 sessions. Resistance training to promote musculoskeletal fitness and improve metabolic factors was  reviewed.         Relevant Orders    Vitamin D 25 Hydroxy    Comprehensive Metabolic Panel    CBC (No Diff)    Protime-INR        Patient's Body mass index is 29.29 kg/m². BMI is within normal parameters. No follow-up required..        RTC 6 months      Carmelina Meza MD  09/23/20  15:07 CDT    Much of this encounter note is an electronic transcription/translation of spoken language to printed text. The electronic translation of spoken language may permit erroneous, or at times, nonsensical words or phrases to be inadvertently transcribed; although I have reviewed the note for such errors, some may still exist.

## 2020-09-23 ENCOUNTER — OFFICE VISIT (OUTPATIENT)
Dept: GASTROENTEROLOGY | Facility: CLINIC | Age: 60
End: 2020-09-23

## 2020-09-23 VITALS
SYSTOLIC BLOOD PRESSURE: 130 MMHG | BODY MASS INDEX: 29.26 KG/M2 | TEMPERATURE: 97.8 F | HEIGHT: 72 IN | WEIGHT: 216 LBS | HEART RATE: 76 BPM | OXYGEN SATURATION: 98 % | DIASTOLIC BLOOD PRESSURE: 80 MMHG

## 2020-09-23 DIAGNOSIS — R79.89 ABNORMAL LIVER FUNCTION TESTS: Primary | ICD-10-CM

## 2020-09-23 DIAGNOSIS — K55.20 ANGIODYSPLASIA: ICD-10-CM

## 2020-09-23 DIAGNOSIS — Z86.010 HX OF COLONIC POLYPS: ICD-10-CM

## 2020-09-23 PROCEDURE — 99213 OFFICE O/P EST LOW 20 MIN: CPT | Performed by: INTERNAL MEDICINE

## 2021-03-21 PROBLEM — Z14.8 ALPHA-1-ANTITRYPSIN DEFICIENCY CARRIER: Status: ACTIVE | Noted: 2021-03-21

## 2021-03-21 PROBLEM — E55.9 VITAMIN D DEFICIENCY: Status: ACTIVE | Noted: 2021-03-21

## 2021-03-21 NOTE — PROGRESS NOTES
Primary Physician: Shana Merrill MD    Chief Complaint   Patient presents with   • Follow-up     Pt presents today for fatty liver follow up; Pt states he is feeling good        Subjective     Tenzin Sousa is a 61 y.o. male.    HPI   Abnormal liver function tests/fatty liver/A1AT deficiency carrier  He had one brother and sister passed on from cirrhosis.  There is yet another brother who is currently alive with liver disease.  The 2 siblings who  from cirrhosis were known diabetics and were overweight.  The brother who is alive with liver disease drink alcohol heavily earlier in his life but is not doing this now.     LFTS have been mildly elevated since .  No history of alcohol intake, blood transfusions, tattoos, high risk sexual activity, or IV drug use.  Alpha-1 antitrypsin level low and phenotype MZ.  He had a negative hepatitis ABC panel, ferritin, ceruloplasmin, ESTELA, AMA, smooth muscle antibody.  Liver sono at [facility] 2014 showed fatty liver.  Sonogram 3/2019 shows fatty liver and Elastography showed F2 status.     The patient is diabetic.  He is hypertensive as well.  He has struggled with weight issues.     LFTs have been trending better.    Vitamin D deficiency  Vitamin D level was on the low side of normal in 2019, but he hasn't started any supplements to date.  He is on 2000 IU daily since 2021.     Adenomatous colon polyp  He had adenomatous colon polyp removed in .  Repeat colonoscopy 2019 is unremarkable.  He will be due for repeat colonoscopy in 2024.      Angiodysplasia of colon  This was seen on colonoscopy 2019.  Hb has been stable most recently 2021.         Past Medical History:   Diagnosis Date   • DM (diabetes mellitus) (CMS/HCC)    • Fatty liver    • History of adenomatous polyp of colon    • HTN (hypertension)    • Hyperlipemia        Past Surgical History:   Procedure Laterality Date   • COLONOSCOPY  2014    One less  "than 5mm tubular adenomatous polyp in the transverse colon; Repeat 5 years    • COLONOSCOPY N/A 1/2/2019    A single non-bleeding colonic angiodysplastic lesion; Examination was otherwise normal on direct and retroflexion views; No specimens collected; Repeat 5 years         Current Outpatient Medications:   •  atorvastatin (LIPITOR) 20 MG tablet, Take 20 mg by mouth Daily., Disp: , Rfl:   •  glimepiride (AMARYL) 2 MG tablet, Take 2 mg by mouth 2 (two) times a day., Disp: , Rfl:   •  lisinopril (PRINIVIL,ZESTRIL) 20 MG tablet, Take 20 mg by mouth Daily., Disp: , Rfl:   •  metFORMIN (GLUCOPHAGE) 500 MG tablet, Take 500 mg by mouth 2 (Two) Times a Day With Meals., Disp: , Rfl:   •  SITagliptin (JANUVIA) 100 MG tablet, Take 100 mg by mouth Daily., Disp: , Rfl:     No Known Allergies    Social History     Socioeconomic History   • Marital status:      Spouse name: Not on file   • Number of children: Not on file   • Years of education: Not on file   • Highest education level: Not on file   Tobacco Use   • Smoking status: Never Smoker   • Smokeless tobacco: Never Used   Vaping Use   • Vaping Use: Never used   Substance and Sexual Activity   • Alcohol use: Not Currently   • Drug use: No   • Sexual activity: Defer       Family History   Problem Relation Age of Onset   • Cirrhosis Sister    • Cirrhosis Brother    • Liver disease Brother    • Colon cancer Neg Hx    • Colon polyps Neg Hx    • Esophageal cancer Neg Hx    • Liver cancer Neg Hx    • Rectal cancer Neg Hx    • Stomach cancer Neg Hx        Review of Systems   Respiratory: Negative for shortness of breath.    Cardiovascular: Negative for chest pain.       Objective     /72 (BP Location: Left arm, Patient Position: Sitting, Cuff Size: Adult)   Pulse 89   Temp 97.8 °F (36.6 °C) (Infrared)   Ht 182.9 cm (72\")   Wt 99.8 kg (220 lb)   SpO2 98%   BMI 29.84 kg/m²     Physical Exam  Constitutional:       Appearance: He is well-developed.   Pulmonary:      " Effort: Pulmonary effort is normal.   Musculoskeletal:         General: Normal range of motion.   Skin:     General: Skin is warm.   Neurological:      Mental Status: He is alert and oriented to person, place, and time.   Psychiatric:         Behavior: Behavior normal.         Lab Results - Last 18 Months   Lab Units 01/15/21  0825 10/01/20  0839 06/05/20  0702 02/24/20  0827 11/18/19  0715   GLUCOSE mg/dL 195* 202* 130* 314* 215*   BUN mg/dL 13 14 11 10 9   CREATININE mg/dL 0.7 0.7 0.7 0.7 0.7   SODIUM mmol/L 139 139 141 134* 139   POTASSIUM mmol/L 5.0 4.2 4.4 5.4* 4.9   CHLORIDE mmol/L 102 101 102 95* 101   TOTAL CO2 mmol/L 26 27 23 27 25   TOTAL PROTEIN g/dL 7.2 7.1 6.7 6.6 7.3   ALBUMIN g/dL 4.7 4.7 4.6 4.6 4.8   ALT (SGPT) U/L 48* 56* 45* 96* 66*   AST (SGOT) U/L 35 43* 39 66* 46*   ALK PHOS U/L 47 48 48 66 52   BILIRUBIN mg/dL 0.8 0.8 0.5 0.9 0.9       Lab Results - Last 18 Months   Lab Units 01/15/21  0917 01/15/21  0825 10/01/20  0839 10/01/20  0839 06/05/20  0702 11/18/19  0715   HEMOGLOBIN g/dL  --  14.7  --  14.2 14.2 15.2   HEMATOCRIT %  --  43.7  --  40.3* 41.6* 43.9   MCV fL  --  88.3  --  87.0 88.9 89.2   WBC K/uL  --  6.1  --  5.2 5.9 6.0   RDW %  --  12.8  --  12.9 12.8 12.8   MPV fL  --  10.6  --  10.5 10.7 10.4   PLATELETS K/uL  --  151  --  154 154 166   INR  1.10  --    < > 1.12  --   --     < > = values in this interval not displayed.       Lab Results - Last 18 Months   Lab Units 01/15/21  0825 10/01/20  0839 06/05/20  0702 11/18/19  0715   TSH uIU/mL 1.500 1.440 1.950 2.240      Results for ERICK BATISTA (MRN [id number]) as of 3/24/2021 11:58   Ref. Range 3/25/2019 16:19   25 Hydroxy, Vitamin D Latest Ref Range: 30.0 - 100.0 ng/ml 31.3         IMPRESSION/PLAN:    Assessment/Plan      Problem List Items Addressed This Visit        Chromosomal and Congenital     Alpha-1-antitrypsin deficiency carrier    Overview     He is PiMZ.            Endocrine and Metabolic    Vitamin D deficiency     Overview     Found on screening labs due to liver disease.         Relevant Orders    Vitamin D 25 Hydroxy       Gastrointestinal Abdominal     History of adenomatous polyp of colon    Overview     Adenomatous colon polyp removed from the transverse colon in January 2014.  Last colonoscopy done January 2019.  Due for repeat colonoscopy in January 2024.         Angiodysplasia    Overview     Seen in the colon in January 2019.         Relevant Orders    CBC (No Diff)    Abnormal liver function tests - Primary    Overview     A full serological evaluation was done in 2014.  The only significant finding is that he has a low alpha-1 antitrypsin level with genotype MZ.  This is not the cause of his abn LFTs.  Sono imaging 5 years ago shows fatty liver.  The patient does have metabolic syndrome.  Working diagnosis is that of nonalcoholic fatty liver disease.  This is reviewed with him.    Laboratories 3/2019:  hemoglobin A1c is elevated to 8.4.  AFP normal  vitamin D level low range of normal-I have advised OTC supplement.  He hasn't started any yet.  APRI and Fib-4 score nondiagnostic for adv fibrosis  F2 on elastography  Fatty liver seen on ultrasound.      The principles of management of steatohepatitis are weight loss through a structured diet and exercise as well as meticulous control of any component of metabolic syndrome, including blood glucose levels, cholesterol and blood pressure.   The patient should strive to lose 2-4 monthly until reaching 7-10% reduction in weight.      Coffee consumption has been shown to decrease the risk of HCC in patients with chronic liver disease.  In these patients, coffee consumption should be encouraged.    I have advised to avoid fructose containing food and drink.     Daily alcohol intake should strickly be below 30gm in men and 20 gm in women.    A physical activity program should in 150-200 min/week of moderate intensity in 3-5 sessions. Resistance training to promote  musculoskeletal fitness and improve metabolic factors was reviewed.         Relevant Orders    Comprehensive Metabolic Panel        Patient's Body mass index is 29.84 kg/m². BMI is within normal parameters. No follow-up required..    Billing 23577  Moderate medical decision making due to 2 or more stable chronic illnesses  Review and/or order of 3 unique tests      RTC 1 year            Carmelina Meza MD  03/24/21  14:18 CDT    Much of this encounter note is an electronic transcription/translation of spoken language to printed text. The electronic translation of spoken language may permit erroneous, or at times, nonsensical words or phrases to be inadvertently transcribed; although I have reviewed the note for such errors, some may still exist.

## 2021-03-24 ENCOUNTER — OFFICE VISIT (OUTPATIENT)
Dept: GASTROENTEROLOGY | Facility: CLINIC | Age: 61
End: 2021-03-24

## 2021-03-24 VITALS
TEMPERATURE: 97.8 F | SYSTOLIC BLOOD PRESSURE: 142 MMHG | HEART RATE: 89 BPM | OXYGEN SATURATION: 98 % | WEIGHT: 220 LBS | BODY MASS INDEX: 29.8 KG/M2 | HEIGHT: 72 IN | DIASTOLIC BLOOD PRESSURE: 72 MMHG

## 2021-03-24 DIAGNOSIS — R79.89 ABNORMAL LIVER FUNCTION TESTS: Primary | ICD-10-CM

## 2021-03-24 DIAGNOSIS — Z14.8 ALPHA-1-ANTITRYPSIN DEFICIENCY CARRIER: ICD-10-CM

## 2021-03-24 DIAGNOSIS — K55.20 ANGIODYSPLASIA: ICD-10-CM

## 2021-03-24 DIAGNOSIS — Z86.010 HISTORY OF ADENOMATOUS POLYP OF COLON: ICD-10-CM

## 2021-03-24 DIAGNOSIS — E55.9 VITAMIN D DEFICIENCY: ICD-10-CM

## 2021-03-24 PROCEDURE — 99214 OFFICE O/P EST MOD 30 MIN: CPT | Performed by: INTERNAL MEDICINE

## 2021-09-22 PROBLEM — Z91.199 H/O NONCOMPLIANCE WITH MEDICAL TREATMENT, PRESENTING HAZARDS TO HEALTH: Status: ACTIVE | Noted: 2021-09-22

## 2022-03-06 NOTE — PROGRESS NOTES
Primary Physician: Shana Merrill MD    Chief Complaint   Patient presents with   • Follow-up     Pt presents today for fatty liver follow up        Subjective     Tenzin Sousa is a 62 y.o. male.    HPI   Abnormal liver function tests/fatty liver/A1AT deficiency carrier  He had one brother and sister passed on from cirrhosis.  There is yet another brother who is currently alive with liver disease.  The 2 siblings who  from cirrhosis were known diabetics and were overweight.  The brother who is alive with liver disease drink alcohol heavily earlier in his life but is not doing this now.     LFTS have been mildly elevated since .  No history of alcohol intake, blood transfusions, tattoos, high risk sexual activity, or IV drug use.  Alpha-1 antitrypsin level low and phenotype MZ.  He had a negative hepatitis ABC panel, ferritin, ceruloplasmin, ESTELA, AMA, smooth muscle antibody.  Liver sono at [facility] 2014 showed fatty liver.  Sonogram 3/2019 shows fatty liver and Elastography showed F2 status.  The patient is diabetic.  He is hypertensive as well.  He has struggled with weight issues.      LFTs just done a few weeks ago--reviewed--LFTs still slightly elevated.     Vitamin D deficiency  Vitamin D level was on the low side of normal in 2019, but he hasn't started any supplements to date.  He is on 2000 IU daily since 2021 and level normalized in 2021.     Adenomatous colon polyp  He had adenomatous colon polyp removed in .  Repeat colonoscopy 2019 is unremarkable.  He will be due for repeat colonoscopy in 2024.      Angiodysplasia of colon  This was seen on colonoscopy 2019.  Hb has been stable most recently 2021.  Hb was just checked--normal.      Past Medical History:   Diagnosis Date   • DM (diabetes mellitus) (HCC)    • Fatty liver    • History of adenomatous polyp of colon    • HTN (hypertension)    • Hyperlipemia        Past Surgical History:  "  Procedure Laterality Date   • COLONOSCOPY  01/03/2014    One less than 5mm tubular adenomatous polyp in the transverse colon; Repeat 5 years    • COLONOSCOPY N/A 1/2/2019    A single non-bleeding colonic angiodysplastic lesion; Examination was otherwise normal on direct and retroflexion views; No specimens collected; Repeat 5 years         Current Outpatient Medications:   •  atorvastatin (LIPITOR) 20 MG tablet, Take 20 mg by mouth Daily., Disp: , Rfl:   •  Cholecalciferol 50 MCG (2000 UT) capsule, Take 2,000 Units by mouth Daily., Disp: , Rfl:   •  glimepiride (AMARYL) 2 MG tablet, Take 2 mg by mouth 2 (two) times a day., Disp: , Rfl:   •  lisinopril (PRINIVIL,ZESTRIL) 20 MG tablet, Take 20 mg by mouth Daily., Disp: , Rfl:   •  metFORMIN (GLUCOPHAGE) 500 MG tablet, Take 500 mg by mouth 2 (Two) Times a Day With Meals., Disp: , Rfl:   •  SITagliptin (JANUVIA) 100 MG tablet, Take 100 mg by mouth Daily., Disp: , Rfl:     No Known Allergies    Social History     Socioeconomic History   • Marital status:    Tobacco Use   • Smoking status: Never Smoker   • Smokeless tobacco: Never Used   Vaping Use   • Vaping Use: Never used   Substance and Sexual Activity   • Alcohol use: Not Currently   • Drug use: No   • Sexual activity: Defer       Family History   Problem Relation Age of Onset   • Cirrhosis Sister    • Cirrhosis Brother    • Liver disease Brother    • Colon cancer Neg Hx    • Colon polyps Neg Hx    • Esophageal cancer Neg Hx    • Liver cancer Neg Hx    • Rectal cancer Neg Hx    • Stomach cancer Neg Hx        Review of Systems   Respiratory: Negative for shortness of breath.    Cardiovascular: Negative for chest pain.       Objective     /66 (BP Location: Left arm, Patient Position: Sitting, Cuff Size: Adult)   Pulse 83   Temp 97.3 °F (36.3 °C) (Infrared)   Ht 182.9 cm (72\")   Wt 98 kg (216 lb)   SpO2 97%   BMI 29.29 kg/m²     Physical Exam  Constitutional:       Appearance: He is well-developed. "   Pulmonary:      Effort: Pulmonary effort is normal.   Musculoskeletal:         General: Normal range of motion.   Skin:     General: Skin is warm.   Neurological:      Mental Status: He is alert and oriented to person, place, and time.   Psychiatric:         Behavior: Behavior normal.         Lab Results - Last 18 Months   Lab Units 02/28/22  0831 10/25/21  0834 07/15/21  0801 04/19/21  0806 01/15/21  0825 10/01/20  0839   GLUCOSE mg/dL 206* 231* 189* 247* 195* 202*   BUN mg/dL 11 9 9 12 13 14   CREATININE mg/dL 0.7 0.8 0.6 0.7 0.7 0.7   SODIUM mmol/L 139 138 138 138 139 139   POTASSIUM mmol/L 4.8 4.8 4.5 4.8 5.0 4.2   CHLORIDE mmol/L 100 99 97* 101 102 101   TOTAL CO2 mmol/L 23 27 29 26 26 27   TOTAL PROTEIN g/dL 7.4 7.2 7.3 7.3 7.2 7.1   ALBUMIN g/dL 4.8 4.6 4.6 4.6 4.7 4.7   ALT (SGPT) U/L 52* 47* 67* 63* 48* 56*   AST (SGOT) U/L 52* 40 52* 43* 35 43*   ALK PHOS U/L 50 52 53 53 47 48   BILIRUBIN mg/dL 1.1 0.7 0.8 0.7 0.8 0.8       Lab Results - Last 18 Months   Lab Units 02/28/22  0831 10/25/21  0834 07/15/21  0801 04/19/21  0806 01/15/21  0917 01/15/21  0825 10/01/20  0839   HEMOGLOBIN g/dL 14.7 14.8 14.8 14.3  --  14.7 14.2   HEMATOCRIT % 42.3 43.2 43.6 42.0  --  43.7 40.3*   MCV fL 87.2 87.6 89.2 88.1  --  88.3 87.0   WBC K/uL 6.9 6.0 6.5 6.3  --  6.1 5.2   RDW % 12.8 12.4 13.0 12.8  --  12.8 12.9   MPV fL 10.3 10.8 11.0 10.4  --  10.6 10.5   PLATELETS K/uL 142 137 136 156  --  151 154   INR   --   --   --   --  1.10  --  1.12       Lab Results - Last 18 Months   Lab Units 02/28/22  0831 10/25/21  0834 07/15/21  0801 04/19/21  0806 01/15/21  0825 10/01/20  0839   TSH uIU/mL 1.760 1.690 2.090 1.430 1.500 1.440   VIT D 25 HYDROXY ng/mL  --   --   --  31.8 15.8* 27.6*          IMPRESSION/PLAN:    Assessment/Plan      Problem List Items Addressed This Visit        Chromosomal and Congenital     Alpha-1-antitrypsin deficiency carrier    Overview     He is PiMZ.  Will refer to pulm for assessment.            Relevant Orders    Ambulatory Referral to Pulmonology       Endocrine and Metabolic    Vitamin D deficiency    Overview     Found on screening labs due to liver disease.  He started Vit D3 2000 IU around 2/2021. Will recheck level with his next labs through PCP.           Relevant Orders    Vitamin D 25 Hydroxy       Gastrointestinal Abdominal     History of adenomatous polyp of colon    Overview     Adenomatous colon polyp removed from the transverse colon in January 2014.  Last colonoscopy done January 2019.  Due for repeat colonoscopy in January 2024.           Angiodysplasia    Overview     Seen in the colon in January 2019.           Abnormal liver function tests - Primary    Overview     A full serological evaluation was done in 2014.  The only significant finding is that he has a low alpha-1 antitrypsin level with genotype MZ.  This is not the cause of his abn LFTs.  Sono imaging 5 years ago shows fatty liver.  The patient does have metabolic syndrome.  Working diagnosis is that of nonalcoholic fatty liver disease as fatty liver seen on sono also.      hemoglobin A1c 3/2019 is 8.4.  .  APRI and Fib-4 score nondiagnostic for adv fibrosis 2019  F2 on elastography    The principles of management of steatohepatitis are weight loss through a structured diet and exercise as well as meticulous control of any component of metabolic syndrome, including blood glucose levels, cholesterol and blood pressure.   The patient should strive to lose 2-4 monthly until reaching 7-10% reduction in weight.      Coffee consumption has been shown to decrease the risk of HCC in patients with chronic liver disease.  In these patients, coffee consumption should be encouraged.    I have advised to avoid fructose containing food and drink.     Daily alcohol intake should strickly be below 30gm in men and 20 gm in women.    A physical activity program should in 150-200 min/week of moderate intensity in 3-5 sessions. Resistance training to  promote musculoskeletal fitness and improve metabolic factors was reviewed.                 MEDICAL COMPLEXITY must have 2 out of 3   Moderate Complexity Level 4                                                                                                              1 of the following medical problems:                                                                                               []One chronic illness with mild exacerbation                                                                                [x]Two or more stable chronic illness                                                                                              []One new problem  []One acute illness with systemic symptoms     Complexity of Data  Reviewed (1 out of the 3 following categories)                                             Category 1 tests, documents, historian (must have 3 points)                                                      []Review of prior external records  [x]Review of results of unique tests-cmp,cbc,tsh  []Ordering unique tests   []Assessment requires an independent historian   Category 2 Interpretation of tests     []Independent interpretation of test read by another doc   Category 3 Discuss Management/tests  []Discussion with external physician     Risk of complications and/or morbidity                                                                                           []Prescription Drug Management     []Decision for elective endoscopic procedure              RTC 1 year      Carmelina Meza MD  03/09/22  14:49 CST    Much of this encounter note is an electronic transcription/translation of spoken language to printed text. The electronic translation of spoken language may permit erroneous, or at times, nonsensical words or phrases to be inadvertently transcribed; although I have reviewed the note for such errors, some may still exist.

## 2022-03-09 ENCOUNTER — OFFICE VISIT (OUTPATIENT)
Dept: GASTROENTEROLOGY | Facility: CLINIC | Age: 62
End: 2022-03-09

## 2022-03-09 VITALS
HEIGHT: 72 IN | HEART RATE: 83 BPM | WEIGHT: 216 LBS | TEMPERATURE: 97.3 F | BODY MASS INDEX: 29.26 KG/M2 | SYSTOLIC BLOOD PRESSURE: 120 MMHG | OXYGEN SATURATION: 97 % | DIASTOLIC BLOOD PRESSURE: 66 MMHG

## 2022-03-09 DIAGNOSIS — K55.20 ANGIODYSPLASIA: ICD-10-CM

## 2022-03-09 DIAGNOSIS — Z86.010 HISTORY OF ADENOMATOUS POLYP OF COLON: ICD-10-CM

## 2022-03-09 DIAGNOSIS — R79.89 ABNORMAL LIVER FUNCTION TESTS: Primary | ICD-10-CM

## 2022-03-09 DIAGNOSIS — Z14.8 ALPHA-1-ANTITRYPSIN DEFICIENCY CARRIER: ICD-10-CM

## 2022-03-09 DIAGNOSIS — E55.9 VITAMIN D DEFICIENCY: ICD-10-CM

## 2022-03-09 PROCEDURE — 99214 OFFICE O/P EST MOD 30 MIN: CPT | Performed by: INTERNAL MEDICINE

## 2022-03-15 DIAGNOSIS — E55.9 VITAMIN D DEFICIENCY: ICD-10-CM

## 2022-03-23 NOTE — PROGRESS NOTES
" JAYLIN Briceño  [facility]   Pulmonary and Critical Care  [address] [place] [address]  Phone: [phone]  Fax: [phone]           Chief Complaint  alpha-1 deficiency carrier    Subjective    History of Present Illness     Tenzin Sousa presents to [facility] PULMONARY & CRITICAL CARE MEDICINE   Mr. Sousa is a pleasant 62 year old male patient referred by Dr. Meza from  for abnormal alpha 1 with MZ genotype and low level from . I do not have access to those results. The PCP he had at that time is not long practicing as a primary care provider and is over the [facility]. Patient reports a history of high cholesterol, hypertension, diabetes mellitus, Vitamin D deficiency, and elevated liver enzymes. He had a brother and sister that are both  from Non-alcoholic liver disease. He has another brother who is living and also has Non-alcoholic liver disease but not doing well. His liver enzymes have been elevated. Most recent labs showed ALT 52, AST 52, Alkaline phosphatase 50. He is a former smoker of 1 PPD for about 15-20 years and he feels he quit about or around . His father is  from an accident at age 39. His mother is still living at age 92 and has diabetes and a pacemaker. There is no known history, family or personal of lung disease. He has worked and retired as a . 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 He denies fever, chills, night sweats, dizziness, syncope, nausea, vomiting, swelling, shortness of breath, cough. He has some diarrhea related to taking Metformin. He is able to do his activities of daily living without any difficulties.        Objective   Vital Signs:   /72   Pulse 80   Ht 182.9 cm (72\")   Wt 99.1 kg (218 lb 6.4 oz)   SpO2 98% Comment: ra  BMI 29.62 kg/m²     Physical Exam  Vitals reviewed.   Constitutional:       Appearance: Normal appearance. "   Cardiovascular:      Rate and Rhythm: Normal rate and regular rhythm.   Pulmonary:      Effort: Pulmonary effort is normal.      Breath sounds: Normal breath sounds.   Neurological:      General: No focal deficit present.      Mental Status: He is alert and oriented to person, place, and time.   Psychiatric:         Mood and Affect: Mood normal.         Behavior: Behavior normal.          Result Review :   The following data was reviewed by: JAYLIN Briceño on 03/28/2022:  Common labs    Common Labsle 7/15/21 7/15/21 7/15/21 7/15/21 7/15/21 10/25/21 10/25/21 10/25/21 10/25/21 2/28/22 2/28/22 2/28/22 2/28/22    0901 0901 0901 0901 0901 0934 0934 0934 0934 0931 0931 0931 0931   Glucose    189 (A)   231 (A)     206 (A)    BUN    9   9     11    Creatinine    0.6   0.8     0.7    eGFR Non African Am    >60   >60     >60    eGFR  Am    >59   >59     >59    Sodium    138   138     139    Potassium    4.5   4.8     4.8    Chloride    97 (A)   99     100    Calcium    10.0   9.5     9.5    Albumin    4.6   4.6     4.8    Total Bilirubin    0.8   0.7     1.1    Alkaline Phosphatase    53   52     50    AST (SGOT)    52 (A)   40     52 (A)    ALT (SGPT)    67 (A)   47 (A)     52 (A)    WBC 6.5     6.0    6.9      Hemoglobin 14.8     14.8    14.7      Hematocrit 43.6     43.2    42.3      Platelets 136     137    142      Total Cholesterol     131 (A)   136 (A)     138 (A)   Triglycerides     199 (A)   215 (A)     172 (A)   HDL Cholesterol     43 (A)   43 (A)     42 (A)   LDL Cholesterol      48   50     62   Hemoglobin A1C  8.0 (A)       8.8 (A)  8.1 (A)     PSA   1.31             (A) Abnormal value       Comments are available for some flowsheets but are not being displayed.             My interpretation of imaging:  CT Chest Nov 2021 [facility] (can not see images)  My interpretation of labs: Elevated liver enzymes, normal TSH, elevated lipid panel, Normal CBC with diff.     CT Chest Nov 2021    Impression  1. No CT evidence of pulmonary malignancy. Recommend continued annual   low-dose CT screening.   2. Coronary artery calcifications.   COMPARISON: No existing relevant imaging studies available   FINDINGS:   Lung nodules: No solid or ground glass nodules are visualized.   Other lung findings: No focal consolidation.   Airway: The airway is patent.   Pleura: No mass or fluid collection.   Vessels: Limited assessment of vasculature without intravenous   contrast. Thoracic aorta is normal in course and caliber.  Normal   pulmonary artery caliber.   Heart and pericardium: Cardiac size is normal.  No pericardial   effusion. Scattered coronary artery calcifications.   Mediastinum: No lymphadenopathy. Esophagus normal in course and   caliber.   Upper abdomen: Visualized portion of the liver, gallbladder, pancreas,   spleen, adrenal glands are within normal limits.   Bones and soft tissues: No acute bony finding. Overlying soft tissues   within normal limits.       My interpretation of the PFT: none    No results found for this or any previous visit.      Patient's BMI 29.62. BMI is within normal parameters. No follow-up required..    Assessment and Plan   Diagnoses and all orders for this visit:    1. Alpha-1-antitrypsin deficiency carrier (Primary)  -     Alpha - 1 - Antitrypsin; Future  -     Cancel: IgA  -     IgA      We discussed alpha 1 and a hand out is provided. He states his wife may have more questions and he is encouraged to have her send us a Tape TV message with any questions. We will start with checking his alpha 1, level and IgA level first. He is asymptomatic with no prior lung history, no family history of lung disease. We discussed that should he be deficient then we would need to check a pulmonary function study on him. We discussed that there is treatment available for deficiency however if his FEV1 on his PFTs is not reduced than we would just need to watch him at this time. He is also  recommended to have siblings and children tested.     Alpha 1: hx of MZ with low level, I do not have access to those records and will be repeated today.     Health maintenance:   Covid-19 Moderna March/ March 2021     Follow Up   Return in about 8 weeks (around 5/23/2022).  Patient was given instructions and counseling regarding his condition or for health maintenance advice. Please see specific information pulled into the AVS if appropriate.     Kiley Crum, APRN  3/28/2022  15:34 CDT

## 2022-03-28 ENCOUNTER — OFFICE VISIT (OUTPATIENT)
Dept: PULMONOLOGY | Facility: CLINIC | Age: 62
End: 2022-03-28

## 2022-03-28 VITALS
WEIGHT: 218.4 LBS | BODY MASS INDEX: 29.58 KG/M2 | DIASTOLIC BLOOD PRESSURE: 72 MMHG | HEIGHT: 72 IN | SYSTOLIC BLOOD PRESSURE: 132 MMHG | HEART RATE: 80 BPM | OXYGEN SATURATION: 98 %

## 2022-03-28 DIAGNOSIS — Z14.8 ALPHA-1-ANTITRYPSIN DEFICIENCY CARRIER: Primary | ICD-10-CM

## 2022-03-28 PROCEDURE — 99203 OFFICE O/P NEW LOW 30 MIN: CPT | Performed by: NURSE PRACTITIONER

## 2022-03-29 LAB — IGA SERPL-MCNC: 149 MG/DL (ref 90–386)

## 2022-04-07 ENCOUNTER — PATIENT ROUNDING (BHMG ONLY) (OUTPATIENT)
Dept: PULMONOLOGY | Facility: CLINIC | Age: 62
End: 2022-04-07

## 2022-04-07 NOTE — PROGRESS NOTES
April 7, 2022    Hello, may I speak with Tenzin Sousa?    My name is Charity Villarreal    I am  with [facility] PULMONARY & CRITICAL CARE MEDICINE  [address]  [facility] [address] [phone].    Before we get started may I verify your date of birth? 1960    I am calling to officially welcome you to our practice and ask about your recent visit. Is this a good time to talk? LVM    Tell me about your visit with us. What things went well?         We're always looking for ways to make our patients' experiences even better. Do you have recommendations on ways we may improve?     Overall were you satisfied with your first visit to our practice?        I appreciate you taking the time to speak with me today. Is there anything else I can do for you      Thank you, and have a great day.

## 2022-04-19 DIAGNOSIS — Z14.8 ALPHA-1-ANTITRYPSIN DEFICIENCY CARRIER: ICD-10-CM

## 2022-04-21 ENCOUNTER — TELEPHONE (OUTPATIENT)
Dept: PULMONOLOGY | Facility: CLINIC | Age: 62
End: 2022-04-21

## 2022-04-21 DIAGNOSIS — E88.01 ALPHA-1-ANTITRYPSIN DEFICIENCY: Primary | ICD-10-CM

## 2022-04-21 NOTE — TELEPHONE ENCOUNTER
Thank you Zeferino. I have placed the order for the CXR as well as the FVL with DLCO. I also failed to mention he should discuss his severity of any liver disease with his GI MD.

## 2022-04-21 NOTE — TELEPHONE ENCOUNTER
----- Message from Tenzin Sousa sent at 4/20/2022  7:17 PM CDT -----  Regarding: Test Result  I noticed my alpha 1 came back abnormal.  Does this mean that I have cirrhosis of the liver and if so how bad?

## 2022-04-21 NOTE — TELEPHONE ENCOUNTER
So please let him know that yes he came back as an MZ( we already knew this) and his level is low which means he is alph 1 antitrypsin deficient. He sees me in follow up on 5/25/22 and I would like for him to have a pulmonary function test- FVL with DLCO on same day if possible. I would also like to get a CXR as well.

## 2022-05-02 ENCOUNTER — TELEPHONE (OUTPATIENT)
Dept: GASTROENTEROLOGY | Facility: CLINIC | Age: 62
End: 2022-05-02

## 2022-05-02 NOTE — TELEPHONE ENCOUNTER
"Tried to call pt re: results-was unable to reach him on his phone-I just kept getting message stating \"Please enter your message number.\" I tried to call his wife, Ila, re: results-was unable to reach her so I left  for her with detailed results and asked her/pt to call me back with questions/problems.   "

## 2022-05-20 NOTE — PROGRESS NOTES
" JAYLIN Briceño  [facility]   Pulmonary and Critical Care  [address] [place] [address]  Phone: [phone]  Fax: [phone]           Chief Complaint  Alpha-1-antitrypsin deficiency carrier (Pulmonary function test today doing well per patient )    Subjective    History of Present Illness     Tenzin Sousa presents to [facility] PULMONARY & CRITICAL CARE MEDICINE   Mr. Sousa is a pleasant 62 year old male patient with known alpha 1 with MZ genotype and low level from . He also has known high cholesterol, hypertension, diabetes mellitus, Vitamin D deficiency, and elevated liver enzymes. He had a brother and sister that are both  from Non-alcoholic liver disease. He has another brother who is living and also has Non-alcoholic liver disease but not doing well. He is a former smoker. He denies fever, chills, night sweats, dizziness, syncope, nausea, vomiting, swelling, shortness of breath, cough. He is able to do his activities of daily living without any difficulties.  As I did not have access to his 2014 results from his alpha 1, I reordered those labs. His results confirmed he was MZ with a quantitative level <20, Low. His IgA was normal. His CMP on  showed his Ast to be normal at 38, ALT a little elevated at 45, and his Alkaline phosphatase normal at 50. Lipid panel with triglycerides normal at 144, HDL 44, LDL 61, . A1C was 6.7. Vitamin D was normal. CBC with Diff was normal. He is here today for CXR and FVL with DLCO. He feels like he had a CXR done but I have no record of it. He will check and let me know if so, where otherwise he will go have one done. FVL with DLCO showed normal.          Objective   Vital Signs:   /74   Pulse 67   Ht 181.6 cm (71.5\")   Wt 98.9 kg (218 lb)   SpO2 97%   BMI 29.98 kg/m²     Physical Exam  Vitals reviewed.   Constitutional:       Appearance: Normal appearance.   Cardiovascular:      Rate " and Rhythm: Normal rate and regular rhythm.   Pulmonary:      Effort: Pulmonary effort is normal.      Breath sounds: Normal breath sounds.   Neurological:      General: No focal deficit present.      Mental Status: He is alert and oriented to person, place, and time.   Psychiatric:         Mood and Affect: Mood normal.         Behavior: Behavior normal.          Result Review :  The following data was reviewed by: JAYLIN Briceño on 05/25/2022:    Data reviewed: Radiologic studies CXR   My interpretation of imaging:  Will get CXR done  My interpretation of labs: As noted in HPI     PFT Values        Some values may be hidden. Unless noted otherwise, only the newest values recorded on each date are displayed.         Old Values PFT Results 5/25/22   No data to display.      Pre Drug PFT Results 5/25/22   FVC 93   FEV1 93   FEF 25-75% 97   FEV1/FVC 77      Post Drug PFT Results 5/25/22   No data to display.      Other Tests PFT Results 5/25/22   DLCO 115   D/VAsb 113           My interpretation of the PFT: Normal    Results for orders placed in visit on 05/25/22    Pulmonary Function Test    Narrative  Pulmonary Function Test  Performed by: Lena Garcia, RRT  Authorized by: Kiley Crum APRN    Pre Drug % Predicted  FVC: 93%  FEV1: 93%  FEF 25-75%: 97%  FEV1/FVC: 77%  DLCO: 115%  D/VAsb: 113%    Interpretation  Spirometry  Spirometry shows normal results.  Diffusion Capacity  The patient's diffusion capacity is normal.  Diffusion capacity is normal when corrected for alveolar volume.      Patient's BMI 29.98. BMI is above normal parameters. Recommendations include: referral to primary care.    Assessment and Plan   Diagnoses and all orders for this visit:    1. Alpha-1-antitrypsin deficiency carrier (Primary)    2. Encounter for preoperative screening laboratory testing for COVID-19 virus  -     COVID PRE-OP / PRE-PROCEDURE SCREENING ORDER (NO ISOLATION) - Swab, Nasal Cavity;  Future      He had a LDCT from his PCP in Nov 2021 at [facility]. He will check with his wife for accurate stop date of his smoking. He feels like it was greater than 15 years ago. He will check on his CXR and if not done elsewhere he will go have it done. We reviewed his normal PFTs. We will plan for follow up in one year with FLV & DLCO in the office.     Alpha 1: MZ with low level, <20     Health maintenance:    Recommend Flu vaccination Oct 2022.     Follow Up   Return in about 1 year (around 5/25/2023) for FVL w DIF in office .  Patient was given instructions and counseling regarding his condition or for health maintenance advice. Please see specific information pulled into the AVS if appropriate.     Kiley Crum, JAYLIN  5/25/2022  09:49 CDT

## 2022-05-24 ENCOUNTER — LAB (OUTPATIENT)
Dept: LAB | Facility: HOSPITAL | Age: 62
End: 2022-05-24

## 2022-05-24 DIAGNOSIS — Z20.822 ENCOUNTER FOR PREOPERATIVE SCREENING LABORATORY TESTING FOR COVID-19 VIRUS: ICD-10-CM

## 2022-05-24 DIAGNOSIS — Z01.812 ENCOUNTER FOR PREOPERATIVE SCREENING LABORATORY TESTING FOR COVID-19 VIRUS: ICD-10-CM

## 2022-05-24 LAB — SARS-COV-2 ORF1AB RESP QL NAA+PROBE: NOT DETECTED

## 2022-05-24 PROCEDURE — U0004 COV-19 TEST NON-CDC HGH THRU: HCPCS

## 2022-05-25 ENCOUNTER — PROCEDURE VISIT (OUTPATIENT)
Dept: PULMONOLOGY | Facility: CLINIC | Age: 62
End: 2022-05-25

## 2022-05-25 ENCOUNTER — OFFICE VISIT (OUTPATIENT)
Dept: PULMONOLOGY | Facility: CLINIC | Age: 62
End: 2022-05-25

## 2022-05-25 VITALS
DIASTOLIC BLOOD PRESSURE: 74 MMHG | HEART RATE: 67 BPM | WEIGHT: 218 LBS | BODY MASS INDEX: 29.53 KG/M2 | SYSTOLIC BLOOD PRESSURE: 126 MMHG | OXYGEN SATURATION: 97 % | HEIGHT: 72 IN

## 2022-05-25 DIAGNOSIS — Z20.822 ENCOUNTER FOR PREOPERATIVE SCREENING LABORATORY TESTING FOR COVID-19 VIRUS: ICD-10-CM

## 2022-05-25 DIAGNOSIS — E88.01 ALPHA-1-ANTITRYPSIN DEFICIENCY: ICD-10-CM

## 2022-05-25 DIAGNOSIS — Z14.8 ALPHA-1-ANTITRYPSIN DEFICIENCY CARRIER: Primary | ICD-10-CM

## 2022-05-25 DIAGNOSIS — Z01.812 ENCOUNTER FOR PREOPERATIVE SCREENING LABORATORY TESTING FOR COVID-19 VIRUS: ICD-10-CM

## 2022-05-25 PROCEDURE — 94010 BREATHING CAPACITY TEST: CPT | Performed by: NURSE PRACTITIONER

## 2022-05-25 PROCEDURE — 94729 DIFFUSING CAPACITY: CPT | Performed by: NURSE PRACTITIONER

## 2022-05-25 PROCEDURE — 99214 OFFICE O/P EST MOD 30 MIN: CPT | Performed by: NURSE PRACTITIONER

## 2022-05-25 NOTE — PROCEDURES
Pulmonary Function Test  Performed by: Lena Garcia, RRT  Authorized by: Kiley Crum APRN      Pre Drug % Predicted    FVC: 93%   FEV1: 93%   FEF 25-75%: 97%   FEV1/FVC: 77%   DLCO: 115%   D/VAsb: 113%    Interpretation   Spirometry   Spirometry shows normal results.   Diffusion Capacity  The patient's diffusion capacity is normal.  Diffusion capacity is normal when corrected for alveolar volume.

## 2023-03-01 ENCOUNTER — OFFICE VISIT (OUTPATIENT)
Dept: GASTROENTEROLOGY | Facility: CLINIC | Age: 63
End: 2023-03-01
Payer: COMMERCIAL

## 2023-03-01 VITALS
HEART RATE: 93 BPM | OXYGEN SATURATION: 97 % | BODY MASS INDEX: 28.99 KG/M2 | DIASTOLIC BLOOD PRESSURE: 72 MMHG | WEIGHT: 214 LBS | HEIGHT: 72 IN | TEMPERATURE: 97.3 F | SYSTOLIC BLOOD PRESSURE: 124 MMHG

## 2023-03-01 DIAGNOSIS — Z86.010 HISTORY OF ADENOMATOUS POLYP OF COLON: ICD-10-CM

## 2023-03-01 DIAGNOSIS — Z14.8 ALPHA-1-ANTITRYPSIN DEFICIENCY CARRIER: ICD-10-CM

## 2023-03-01 DIAGNOSIS — K55.20 ANGIODYSPLASIA: ICD-10-CM

## 2023-03-01 DIAGNOSIS — R79.89 ABNORMAL LIVER FUNCTION TESTS: Primary | ICD-10-CM

## 2023-03-01 DIAGNOSIS — E88.01 ALPHA-1-ANTITRYPSIN DEFICIENCY: Chronic | ICD-10-CM

## 2023-03-01 DIAGNOSIS — E55.9 VITAMIN D DEFICIENCY: ICD-10-CM

## 2023-03-01 PROCEDURE — 99214 OFFICE O/P EST MOD 30 MIN: CPT | Performed by: INTERNAL MEDICINE

## 2023-03-01 NOTE — ASSESSMENT & PLAN NOTE
Laboratories from [facility] 3 weeks ago reviewed.  Liver function tests are now completely normal.  I advised patient to continue to control sugars very tightly exercise, and work on losing a small amount of weight.

## 2023-05-29 NOTE — PROGRESS NOTES
" JAYLIN Briceño  [facility]   Pulmonary and Critical Care  [address] [place] [address]  Phone: [phone]  Fax: [phone]           Chief Complaint  Alpha-1 Antitrypsin Deficiency    Subjective    History of Present Illness     Tenzin Sousa presents to [facility] PULMONARY & CRITICAL CARE MEDICINE   History of Present Illness  Mr. Sousa is a pleasant 63 year old male patient with known alpha 1 with MZ genotype and low level from . He also has known high cholesterol, hypertension, diabetes mellitus, Vitamin D deficiency, and elevated liver enzymes. He had a brother and sister that are both  from Non-alcoholic liver disease. He has another brother who is living and also has Non-alcoholic liver disease. He is a former smoker. He denies fever, chills, night sweats, dizziness, syncope, nausea, vomiting, swelling, shortness of breath, cough. His FVL and DLCO were normal today.            Objective   Vital Signs:   /72   Pulse 76   Resp 16   Ht 182.9 cm (72\")   Wt 95.7 kg (211 lb)   SpO2 97% Comment: RA  BMI 28.62 kg/m²     Physical Exam  Vitals reviewed.   Constitutional:       Appearance: Normal appearance.   Cardiovascular:      Rate and Rhythm: Normal rate and regular rhythm.   Pulmonary:      Effort: Pulmonary effort is normal.      Breath sounds: Normal breath sounds.   Neurological:      General: No focal deficit present.      Mental Status: He is alert and oriented to person, place, and time.   Psychiatric:         Mood and Affect: Mood normal.         Behavior: Behavior normal.        Result Review :  The following data was reviewed by: JAYLIN Briceño on 2023:    My interpretation of imaging:  none  My interpretation of labs: none     PFT Values          2022    09:15 2023    09:15   Pre Drug PFT Results   FVC 93 93   FEV1 93 96   FEF 25-75% 97 111   FEV1/FVC 77 79   Other Tests PFT Results   DLCO 115 " 111   D/VAsb 113 113     My interpretation of the PFT : Normal spirometry and diffusion capacity.     Results for orders placed in visit on 05/31/23    Pulmonary Function Test    Narrative  Pulmonary Function Test  Performed by: Lena Garcia, CINDY  Authorized by: Kiley Crum APRN    Pre Drug % Predicted  FVC: 93%  FEV1: 96%  FEF 25-75%: 111%  FEV1/FVC: 79%  DLCO: 111%  D/VAsb: 113%    Interpretation  Spirometry  Spirometry shows normal results. Post-bronchodilator the ratio shows normal results.  Review of FVL curve  Patient's effort is normal.  Diffusion Capacity  The patient's diffusion capacity is normal.  Diffusion capacity is normal when corrected for alveolar volume.  Overall comments: Stable compared to last year      Results for orders placed in visit on 05/25/22    Pulmonary Function Test    Narrative  Pulmonary Function Test  Performed by: Lena Garcia, CINDY  Authorized by: Kiley Crum APRN    Pre Drug % Predicted  FVC: 93%  FEV1: 93%  FEF 25-75%: 97%  FEV1/FVC: 77%  DLCO: 115%  D/VAsb: 113%    Interpretation  Spirometry  Spirometry shows normal results.  Diffusion Capacity  The patient's diffusion capacity is normal.  Diffusion capacity is normal when corrected for alveolar volume.        Assessment and Plan   Diagnoses and all orders for this visit:    1. Alpha-1-antitrypsin deficiency (Primary)  -     Pulmonary Function Test  -     Pulmonary Function Test; Future        He is stable and asymptomatic. We will plan yearly visit with FVL and DLCO in office when he returns.       Follow Up   Return in about 1 year (around 5/31/2024) for FVL w DIF IN OFFICE .  Patient was given instructions and counseling regarding his condition or for health maintenance advice. Please see specific information pulled into the AVS if appropriate.     JAYLIN Briceño  5/31/2023  09:37 CDT

## 2023-05-31 ENCOUNTER — PROCEDURE VISIT (OUTPATIENT)
Dept: PULMONOLOGY | Facility: CLINIC | Age: 63
End: 2023-05-31

## 2023-05-31 ENCOUNTER — OFFICE VISIT (OUTPATIENT)
Dept: PULMONOLOGY | Facility: CLINIC | Age: 63
End: 2023-05-31

## 2023-05-31 VITALS
SYSTOLIC BLOOD PRESSURE: 134 MMHG | HEART RATE: 76 BPM | DIASTOLIC BLOOD PRESSURE: 72 MMHG | BODY MASS INDEX: 28.58 KG/M2 | RESPIRATION RATE: 16 BRPM | HEIGHT: 72 IN | WEIGHT: 211 LBS | OXYGEN SATURATION: 97 %

## 2023-05-31 DIAGNOSIS — E88.01 ALPHA-1-ANTITRYPSIN DEFICIENCY: Primary | ICD-10-CM

## 2023-05-31 NOTE — PROCEDURES
Pulmonary Function Test  Performed by: Lena Garcia, RRT  Authorized by: Kiley Crum APRN      Pre Drug % Predicted    FVC: 93%   FEV1: 96%   FEF 25-75%: 111%   FEV1/FVC: 79%   DLCO: 111%   D/VAsb: 113%    Interpretation   Spirometry   Spirometry shows normal results. Post-bronchodilator the ratio shows normal results.   Review of FVL curve   Patient's effort is normal.   Diffusion Capacity  The patient's diffusion capacity is normal.  Diffusion capacity is normal when corrected for alveolar volume.   Overall comments: Stable compared to last year

## (undated) DEVICE — ENDOGATOR AUXILIARY WATER JET CONNECTOR: Brand: ENDOGATOR

## (undated) DEVICE — TBG SMPL FLTR LINE NASL 02/C02 A/ BX/100

## (undated) DEVICE — Device: Brand: DEFENDO AIR/WATER/SUCTION AND BIOPSY VALVE

## (undated) DEVICE — SENSR O2 OXIMAX FNGR A/ 18IN NONSTR

## (undated) DEVICE — CUFF,BP,DISP,1 TUBE,ADULT,HP: Brand: MEDLINE

## (undated) DEVICE — THE CHANNEL CLEANING BRUSH IS A NYLON FLEXI BRUSH ATTACHED TO A FLEXIBLE PLASTIC SHEATH DESIGNED TO SAFELY REMOVE DEBRIS FROM FLEXIBLE ENDOSCOPES.

## (undated) DEVICE — MASK,OXYGEN,MED CONC,ADLT,7' TUB, UC: Brand: PENDING

## (undated) DEVICE — YANKAUER,BULB TIP WITH VENT: Brand: ARGYLE